# Patient Record
Sex: FEMALE | Race: BLACK OR AFRICAN AMERICAN | NOT HISPANIC OR LATINO | URBAN - METROPOLITAN AREA
[De-identification: names, ages, dates, MRNs, and addresses within clinical notes are randomized per-mention and may not be internally consistent; named-entity substitution may affect disease eponyms.]

---

## 2021-03-15 ENCOUNTER — INPATIENT (INPATIENT)
Facility: HOSPITAL | Age: 46
LOS: 4 days | Discharge: HOME | DRG: 205 | End: 2021-03-20
Attending: INTERNAL MEDICINE | Admitting: INTERNAL MEDICINE
Payer: COMMERCIAL

## 2021-03-15 VITALS
DIASTOLIC BLOOD PRESSURE: 86 MMHG | OXYGEN SATURATION: 96 % | WEIGHT: 289.91 LBS | HEART RATE: 117 BPM | RESPIRATION RATE: 22 BRPM | SYSTOLIC BLOOD PRESSURE: 161 MMHG | HEIGHT: 70 IN | TEMPERATURE: 99 F

## 2021-03-15 DIAGNOSIS — Z98.891 HISTORY OF UTERINE SCAR FROM PREVIOUS SURGERY: Chronic | ICD-10-CM

## 2021-03-15 DIAGNOSIS — Z98.890 OTHER SPECIFIED POSTPROCEDURAL STATES: Chronic | ICD-10-CM

## 2021-03-15 DIAGNOSIS — D21.9 BENIGN NEOPLASM OF CONNECTIVE AND OTHER SOFT TISSUE, UNSPECIFIED: ICD-10-CM

## 2021-03-15 DIAGNOSIS — R06.02 SHORTNESS OF BREATH: ICD-10-CM

## 2021-03-15 LAB
ALBUMIN SERPL ELPH-MCNC: 4 G/DL — SIGNIFICANT CHANGE UP (ref 3.3–5)
ALBUMIN SERPL ELPH-MCNC: 4.1 G/DL — SIGNIFICANT CHANGE UP (ref 3.3–5)
ALP SERPL-CCNC: 64 U/L — SIGNIFICANT CHANGE UP (ref 40–120)
ALP SERPL-CCNC: 65 U/L — SIGNIFICANT CHANGE UP (ref 40–120)
ALT FLD-CCNC: 21 U/L — SIGNIFICANT CHANGE UP (ref 10–45)
ALT FLD-CCNC: 25 U/L — SIGNIFICANT CHANGE UP (ref 10–45)
ANION GAP SERPL CALC-SCNC: 10 MMOL/L — SIGNIFICANT CHANGE UP (ref 5–17)
ANION GAP SERPL CALC-SCNC: 13 MMOL/L — SIGNIFICANT CHANGE UP (ref 5–17)
APTT BLD: 27.4 SEC — LOW (ref 27.5–35.5)
AST SERPL-CCNC: 25 U/L — SIGNIFICANT CHANGE UP (ref 10–40)
AST SERPL-CCNC: 26 U/L — SIGNIFICANT CHANGE UP (ref 10–40)
BASE EXCESS BLDV CALC-SCNC: 5.9 MMOL/L — HIGH (ref -2–2)
BASE EXCESS BLDV CALC-SCNC: 7.9 MMOL/L — HIGH (ref -2–2)
BASE EXCESS BLDV CALC-SCNC: 9.6 MMOL/L — HIGH (ref -2–2)
BASOPHILS # BLD AUTO: 0 K/UL — SIGNIFICANT CHANGE UP (ref 0–0.2)
BASOPHILS # BLD AUTO: 0.02 K/UL — SIGNIFICANT CHANGE UP (ref 0–0.2)
BASOPHILS NFR BLD AUTO: 0 % — SIGNIFICANT CHANGE UP (ref 0–2)
BASOPHILS NFR BLD AUTO: 0.2 % — SIGNIFICANT CHANGE UP (ref 0–2)
BILIRUB SERPL-MCNC: 0.4 MG/DL — SIGNIFICANT CHANGE UP (ref 0.2–1.2)
BILIRUB SERPL-MCNC: 0.4 MG/DL — SIGNIFICANT CHANGE UP (ref 0.2–1.2)
BUN SERPL-MCNC: 10 MG/DL — SIGNIFICANT CHANGE UP (ref 7–23)
BUN SERPL-MCNC: 12 MG/DL — SIGNIFICANT CHANGE UP (ref 7–23)
CA-I SERPL-SCNC: 1.19 MMOL/L — SIGNIFICANT CHANGE UP (ref 1.12–1.3)
CA-I SERPL-SCNC: 1.2 MMOL/L — SIGNIFICANT CHANGE UP (ref 1.12–1.3)
CA-I SERPL-SCNC: 1.25 MMOL/L — SIGNIFICANT CHANGE UP (ref 1.12–1.3)
CALCIUM SERPL-MCNC: 9.7 MG/DL — SIGNIFICANT CHANGE UP (ref 8.4–10.5)
CALCIUM SERPL-MCNC: 9.8 MG/DL — SIGNIFICANT CHANGE UP (ref 8.4–10.5)
CHLORIDE BLDV-SCNC: 94 MMOL/L — LOW (ref 96–108)
CHLORIDE BLDV-SCNC: 95 MMOL/L — LOW (ref 96–108)
CHLORIDE BLDV-SCNC: 99 MMOL/L — SIGNIFICANT CHANGE UP (ref 96–108)
CHLORIDE SERPL-SCNC: 94 MMOL/L — LOW (ref 96–108)
CHLORIDE SERPL-SCNC: 94 MMOL/L — LOW (ref 96–108)
CO2 BLDV-SCNC: 39 MMOL/L — HIGH (ref 22–30)
CO2 BLDV-SCNC: 40 MMOL/L — HIGH (ref 22–30)
CO2 BLDV-SCNC: 41 MMOL/L — HIGH (ref 22–30)
CO2 SERPL-SCNC: 32 MMOL/L — HIGH (ref 22–31)
CO2 SERPL-SCNC: 37 MMOL/L — HIGH (ref 22–31)
CREAT SERPL-MCNC: 0.71 MG/DL — SIGNIFICANT CHANGE UP (ref 0.5–1.3)
CREAT SERPL-MCNC: 0.76 MG/DL — SIGNIFICANT CHANGE UP (ref 0.5–1.3)
DACRYOCYTES BLD QL SMEAR: SLIGHT — SIGNIFICANT CHANGE UP
ELLIPTOCYTES BLD QL SMEAR: SLIGHT — SIGNIFICANT CHANGE UP
EOSINOPHIL # BLD AUTO: 0 K/UL — SIGNIFICANT CHANGE UP (ref 0–0.5)
EOSINOPHIL # BLD AUTO: 0.04 K/UL — SIGNIFICANT CHANGE UP (ref 0–0.5)
EOSINOPHIL NFR BLD AUTO: 0 % — SIGNIFICANT CHANGE UP (ref 0–6)
EOSINOPHIL NFR BLD AUTO: 0.4 % — SIGNIFICANT CHANGE UP (ref 0–6)
GAS PNL BLDA: SIGNIFICANT CHANGE UP
GAS PNL BLDV: 138 MMOL/L — SIGNIFICANT CHANGE UP (ref 135–145)
GAS PNL BLDV: 138 MMOL/L — SIGNIFICANT CHANGE UP (ref 135–145)
GAS PNL BLDV: 140 MMOL/L — SIGNIFICANT CHANGE UP (ref 135–145)
GAS PNL BLDV: SIGNIFICANT CHANGE UP
GIANT PLATELETS BLD QL SMEAR: PRESENT — SIGNIFICANT CHANGE UP
GLUCOSE BLDC GLUCOMTR-MCNC: 120 MG/DL — HIGH (ref 70–99)
GLUCOSE BLDV-MCNC: 109 MG/DL — HIGH (ref 70–99)
GLUCOSE BLDV-MCNC: 118 MG/DL — HIGH (ref 70–99)
GLUCOSE BLDV-MCNC: 141 MG/DL — HIGH (ref 70–99)
GLUCOSE SERPL-MCNC: 119 MG/DL — HIGH (ref 70–99)
GLUCOSE SERPL-MCNC: 131 MG/DL — HIGH (ref 70–99)
HCG SERPL-ACNC: <2 MIU/ML — SIGNIFICANT CHANGE UP
HCO3 BLDV-SCNC: 36 MMOL/L — HIGH (ref 21–29)
HCO3 BLDV-SCNC: 38 MMOL/L — HIGH (ref 21–29)
HCO3 BLDV-SCNC: 38 MMOL/L — HIGH (ref 21–29)
HCT VFR BLD CALC: 34.7 % — SIGNIFICANT CHANGE UP (ref 34.5–45)
HCT VFR BLD CALC: 34.9 % — SIGNIFICANT CHANGE UP (ref 34.5–45)
HCT VFR BLDA CALC: 26 % — LOW (ref 39–50)
HCT VFR BLDA CALC: 28 % — LOW (ref 39–50)
HCT VFR BLDA CALC: 30 % — LOW (ref 39–50)
HGB BLD CALC-MCNC: 8.3 G/DL — LOW (ref 11.5–15.5)
HGB BLD CALC-MCNC: 9 G/DL — LOW (ref 11.5–15.5)
HGB BLD CALC-MCNC: 9.6 G/DL — LOW (ref 11.5–15.5)
HGB BLD-MCNC: 8.8 G/DL — LOW (ref 11.5–15.5)
HGB BLD-MCNC: 9 G/DL — LOW (ref 11.5–15.5)
HYPOCHROMIA BLD QL: SIGNIFICANT CHANGE UP
IMM GRANULOCYTES NFR BLD AUTO: 0.6 % — SIGNIFICANT CHANGE UP (ref 0–1.5)
INR BLD: 1.11 RATIO — SIGNIFICANT CHANGE UP (ref 0.88–1.16)
LACTATE BLDV-MCNC: 1.2 MMOL/L — SIGNIFICANT CHANGE UP (ref 0.7–2)
LACTATE BLDV-MCNC: 1.2 MMOL/L — SIGNIFICANT CHANGE UP (ref 0.7–2)
LACTATE BLDV-MCNC: 2.1 MMOL/L — HIGH (ref 0.7–2)
LYMPHOCYTES # BLD AUTO: 1.18 K/UL — SIGNIFICANT CHANGE UP (ref 1–3.3)
LYMPHOCYTES # BLD AUTO: 1.38 K/UL — SIGNIFICANT CHANGE UP (ref 1–3.3)
LYMPHOCYTES # BLD AUTO: 12.5 % — LOW (ref 13–44)
LYMPHOCYTES # BLD AUTO: 17.4 % — SIGNIFICANT CHANGE UP (ref 13–44)
MAGNESIUM SERPL-MCNC: 2 MG/DL — SIGNIFICANT CHANGE UP (ref 1.6–2.6)
MANUAL SMEAR VERIFICATION: SIGNIFICANT CHANGE UP
MCHC RBC-ENTMCNC: 18.6 PG — LOW (ref 27–34)
MCHC RBC-ENTMCNC: 18.7 PG — LOW (ref 27–34)
MCHC RBC-ENTMCNC: 25.2 GM/DL — LOW (ref 32–36)
MCHC RBC-ENTMCNC: 25.9 GM/DL — LOW (ref 32–36)
MCV RBC AUTO: 72.1 FL — LOW (ref 80–100)
MCV RBC AUTO: 73.6 FL — LOW (ref 80–100)
MICROCYTES BLD QL: SLIGHT — SIGNIFICANT CHANGE UP
MONOCYTES # BLD AUTO: 0.97 K/UL — HIGH (ref 0–0.9)
MONOCYTES # BLD AUTO: 1.21 K/UL — HIGH (ref 0–0.9)
MONOCYTES NFR BLD AUTO: 12.2 % — SIGNIFICANT CHANGE UP (ref 2–14)
MONOCYTES NFR BLD AUTO: 12.8 % — SIGNIFICANT CHANGE UP (ref 2–14)
NEUTROPHILS # BLD AUTO: 5.57 K/UL — SIGNIFICANT CHANGE UP (ref 1.8–7.4)
NEUTROPHILS # BLD AUTO: 6.95 K/UL — SIGNIFICANT CHANGE UP (ref 1.8–7.4)
NEUTROPHILS NFR BLD AUTO: 70.4 % — SIGNIFICANT CHANGE UP (ref 43–77)
NEUTROPHILS NFR BLD AUTO: 73.5 % — SIGNIFICANT CHANGE UP (ref 43–77)
NRBC # BLD: 1 /100 WBCS — HIGH (ref 0–0)
NT-PROBNP SERPL-SCNC: 1345 PG/ML — HIGH (ref 0–300)
PCO2 BLDV: 100 MMHG — HIGH (ref 35–50)
PCO2 BLDV: 101 MMHG — HIGH (ref 35–50)
PCO2 BLDV: 83 MMHG — HIGH (ref 35–50)
PH BLDV: 7.17 — CRITICAL LOW (ref 7.35–7.45)
PH BLDV: 7.2 — LOW (ref 7.35–7.45)
PH BLDV: 7.28 — LOW (ref 7.35–7.45)
PHOSPHATE SERPL-MCNC: 5.1 MG/DL — HIGH (ref 2.5–4.5)
PLAT MORPH BLD: ABNORMAL
PLATELET # BLD AUTO: 509 K/UL — HIGH (ref 150–400)
PLATELET # BLD AUTO: 560 K/UL — HIGH (ref 150–400)
PO2 BLDV: 25 MMHG — SIGNIFICANT CHANGE UP (ref 25–45)
PO2 BLDV: 51 MMHG — HIGH (ref 25–45)
PO2 BLDV: 52 MMHG — HIGH (ref 25–45)
POIKILOCYTOSIS BLD QL AUTO: SLIGHT — SIGNIFICANT CHANGE UP
POLYCHROMASIA BLD QL SMEAR: SIGNIFICANT CHANGE UP
POTASSIUM BLDV-SCNC: 4.2 MMOL/L — SIGNIFICANT CHANGE UP (ref 3.5–5.3)
POTASSIUM BLDV-SCNC: 4.3 MMOL/L — SIGNIFICANT CHANGE UP (ref 3.5–5.3)
POTASSIUM BLDV-SCNC: 4.4 MMOL/L — SIGNIFICANT CHANGE UP (ref 3.5–5.3)
POTASSIUM SERPL-MCNC: 3.8 MMOL/L — SIGNIFICANT CHANGE UP (ref 3.5–5.3)
POTASSIUM SERPL-MCNC: 4.4 MMOL/L — SIGNIFICANT CHANGE UP (ref 3.5–5.3)
POTASSIUM SERPL-SCNC: 3.8 MMOL/L — SIGNIFICANT CHANGE UP (ref 3.5–5.3)
POTASSIUM SERPL-SCNC: 4.4 MMOL/L — SIGNIFICANT CHANGE UP (ref 3.5–5.3)
PROT SERPL-MCNC: 7.5 G/DL — SIGNIFICANT CHANGE UP (ref 6–8.3)
PROT SERPL-MCNC: 7.7 G/DL — SIGNIFICANT CHANGE UP (ref 6–8.3)
PROTHROM AB SERPL-ACNC: 13.3 SEC — SIGNIFICANT CHANGE UP (ref 10.6–13.6)
RBC # BLD: 4.74 M/UL — SIGNIFICANT CHANGE UP (ref 3.8–5.2)
RBC # BLD: 4.81 M/UL — SIGNIFICANT CHANGE UP (ref 3.8–5.2)
RBC # FLD: 23.2 % — HIGH (ref 10.3–14.5)
RBC # FLD: 23.6 % — HIGH (ref 10.3–14.5)
RBC BLD AUTO: ABNORMAL
SAO2 % BLDV: 31 % — LOW (ref 67–88)
SAO2 % BLDV: 73 % — SIGNIFICANT CHANGE UP (ref 67–88)
SAO2 % BLDV: 74 % — SIGNIFICANT CHANGE UP (ref 67–88)
SARS-COV-2 RNA SPEC QL NAA+PROBE: SIGNIFICANT CHANGE UP
SODIUM SERPL-SCNC: 139 MMOL/L — SIGNIFICANT CHANGE UP (ref 135–145)
SODIUM SERPL-SCNC: 141 MMOL/L — SIGNIFICANT CHANGE UP (ref 135–145)
TARGETS BLD QL SMEAR: SIGNIFICANT CHANGE UP
TROPONIN T, HIGH SENSITIVITY RESULT: 13 NG/L — SIGNIFICANT CHANGE UP (ref 0–51)
TROPONIN T, HIGH SENSITIVITY RESULT: 14 NG/L — SIGNIFICANT CHANGE UP (ref 0–51)
TSH SERPL-MCNC: 2.33 UIU/ML — SIGNIFICANT CHANGE UP (ref 0.27–4.2)
WBC # BLD: 7.91 K/UL — SIGNIFICANT CHANGE UP (ref 3.8–10.5)
WBC # BLD: 9.46 K/UL — SIGNIFICANT CHANGE UP (ref 3.8–10.5)
WBC # FLD AUTO: 7.91 K/UL — SIGNIFICANT CHANGE UP (ref 3.8–10.5)
WBC # FLD AUTO: 9.46 K/UL — SIGNIFICANT CHANGE UP (ref 3.8–10.5)

## 2021-03-15 PROCEDURE — 70450 CT HEAD/BRAIN W/O DYE: CPT | Mod: 26,MA

## 2021-03-15 PROCEDURE — 71275 CT ANGIOGRAPHY CHEST: CPT | Mod: 26,MA

## 2021-03-15 PROCEDURE — 93010 ELECTROCARDIOGRAM REPORT: CPT | Mod: NC

## 2021-03-15 PROCEDURE — 99285 EMERGENCY DEPT VISIT HI MDM: CPT

## 2021-03-15 RX ORDER — FUROSEMIDE 40 MG
40 TABLET ORAL DAILY
Refills: 0 | Status: DISCONTINUED | OUTPATIENT
Start: 2021-03-15 | End: 2021-03-17

## 2021-03-15 RX ORDER — FUROSEMIDE 40 MG
40 TABLET ORAL ONCE
Refills: 0 | Status: COMPLETED | OUTPATIENT
Start: 2021-03-15 | End: 2021-03-15

## 2021-03-15 RX ORDER — ONDANSETRON 8 MG/1
4 TABLET, FILM COATED ORAL ONCE
Refills: 0 | Status: COMPLETED | OUTPATIENT
Start: 2021-03-15 | End: 2021-03-15

## 2021-03-15 RX ORDER — HEPARIN SODIUM 5000 [USP'U]/ML
5000 INJECTION INTRAVENOUS; SUBCUTANEOUS EVERY 12 HOURS
Refills: 0 | Status: DISCONTINUED | OUTPATIENT
Start: 2021-03-15 | End: 2021-03-20

## 2021-03-15 RX ORDER — SODIUM CHLORIDE 9 MG/ML
1000 INJECTION, SOLUTION INTRAVENOUS ONCE
Refills: 0 | Status: COMPLETED | OUTPATIENT
Start: 2021-03-15 | End: 2021-03-15

## 2021-03-15 RX ADMIN — Medication 40 MILLIGRAM(S): at 14:50

## 2021-03-15 RX ADMIN — HEPARIN SODIUM 5000 UNIT(S): 5000 INJECTION INTRAVENOUS; SUBCUTANEOUS at 18:54

## 2021-03-15 RX ADMIN — SODIUM CHLORIDE 1000 MILLILITER(S): 9 INJECTION, SOLUTION INTRAVENOUS at 08:55

## 2021-03-15 NOTE — ED ADULT NURSE REASSESSMENT NOTE - NS ED NURSE REASSESS COMMENT FT1
Report received from Mana CARTAGENA.  Pt is resting comfortably on bipap.  Admitting at bedside.  Pt to receive lasix as per MD orders.  Safety and comfort maintained.  Will continue to monitor.

## 2021-03-15 NOTE — H&P ADULT - PROBLEM SELECTOR PLAN 1
Possibly from pulmonary HTN. Lasix 40 mgx1 ordered. Will get pulmonary and cardiology evaluation, TTE

## 2021-03-15 NOTE — ED PROVIDER NOTE - SEVERE SEPSIS ALERT DETAILS 2
ANNELISE Arceo:  I have seen and evaluated this patient and do not believe the patient is septic at this time.  I will continue to evaluate.

## 2021-03-15 NOTE — ED ADULT NURSE REASSESSMENT NOTE - NS ED NURSE REASSESS COMMENT FT1
Pt assisted with bedpan.  Pt tolerated well.  Safety and comfort maintained. Steady gait noted. Will continue to monitor.

## 2021-03-15 NOTE — H&P ADULT - HISTORY OF PRESENT ILLNESS
46 y/o female with PMHx fibroids s/p myomectomy x2 PSx  works from home now presenting to the ED with SOB x2 weeks and anxiety causing insomnia x2 weeks. Patient reported she cannot sleep more than 1-2 hours per night and is now falling asleep mid sentence slurring her words. Patient tried to take valerian root with no improvement of sleep. Patient has recent stressor of moving. Patient also feels like she cannot take a deep breath and catch her breath. Strong family history of lung cancer. Patient denied CP, h/o COVID, abdominal pain, N/V/D, weight loss, rash, fever chills, cough, back pain, dizziness, syncope, palpitations, h/o insomnia  CT Chest did not show PE, but did show dilated pulm artery

## 2021-03-15 NOTE — ED ADULT NURSE NOTE - OBJECTIVE STATEMENT
44 yo female presents to ED from home co insomnia x2 weeks with worsening SOB with exertion and when laying flat to sleep. Patient reports difficulty sleeping related to anxiety about SOB when laying flat, reports "falling asleep mid conversation". Patient denies CP, nvd, sick contacts, falls/loc. Patient A&OX3, breathing spontaneously, airway patent, bl clear lungs, abdomen nontender, +pulses, cap refill <2 seconds. Patient resting in bed, side rails up, plan of care explained. MD at bedside, cardiac monitor in place.

## 2021-03-15 NOTE — CONSULT NOTE ADULT - SUBJECTIVE AND OBJECTIVE BOX
HISTORY OF PRESENT ILLNESS: HPI:  44 y/o female with PMHx fibroids s/p myomectomy x2 PSx  works from home now presenting to the ED with SOB x2 weeks and anxiety causing insomnia x2 weeks. Patient reported she cannot sleep more than 1-2 hours per night and is now falling asleep mid sentence slurring her words. Patient tried to take valerian root with no improvement of sleep. Patient has recent stressor of moving. Patient also feels like she cannot take a deep breath and catch her breath. Strong family history of lung cancer. Patient denied CP, h/o COVID, abdominal pain, N/V/D, weight loss, rash, fever chills, cough, back pain, dizziness, syncope, palpitations, h/o insomnia  CT Chest did not show PE, but did show dilated pulm artery She is currently requiring BipAp for hypercarbia.  No CP nO LOC      PAST MEDICAL & SURGICAL HISTORY:  Fibroid    H/O:     H/O myomectomy            MEDICATIONS:  MEDICATIONS  (STANDING):  furosemide   Injectable 40 milliGRAM(s) IV Push daily  heparin   Injectable 5000 Unit(s) SubCutaneous every 12 hours      Allergies    No Known Allergies    Intolerances        FAMILY HISTORY:    Non-contributary for premature coronary disease or sudden cardiac death    SOCIAL HISTORY:    [ X] Non-smoker  [ ] Smoker  [ ] Alcohol    REVIEW OF SYSTEMS:  [ ]chest pain  [  ]shortness of breath  [  ]palpitations  [  ]syncope  [ ]near syncope [ ]upper extremity weakness   [ ] lower extremity weakness  [  ]diplopia  [  ]altered mental status   [  ]fevers  [ ]chills [ ]nausea  [ ]vomitting  [  ]dysphagia    [ ]abdominal pain  [ ]melena  [ ]BRBPR    [  ]epistaxis  [  ]rash    [ ]lower extremity edema        [ X] All others negative	  [ ] Unable to obtain      LABS:	 	    CARDIAC MARKERS:                              9.0    7.91  )-----------( 560      ( 15 Mar 2021 09:01 )             34.7     Hb Trend: 9.0<--    03-15    139  |  94<L>  |  12  ----------------------------<  131<H>  3.8   |  32<H>  |  0.76    Ca    9.7      15 Mar 2021 09:01    TPro  7.7  /  Alb  4.0  /  TBili  0.4  /  DBili  x   /  AST  26  /  ALT  21  /  AlkPhos  65  03-15    Creatinine Trend: 0.76<--    Coags:  PT/INR - ( 15 Mar 2021 09:01 )   PT: 13.3 sec;   INR: 1.11 ratio         PTT - ( 15 Mar 2021 09:01 )  PTT:27.4 sec    proBNP: Serum Pro-Brain Natriuretic Peptide: 1345 pg/mL (03-15 @ 09:01)    Lipid Profile:   HgA1c:   TSH: Thyroid Stimulating Hormone, Serum: 2.33 uIU/mL (03-15 @ 12:07)          PHYSICAL EXAM:  T(C): 35.6 (03-15-21 @ 18:15), Max: 37.4 (03-15-21 @ 08:05)  HR: 104 (03-15-21 @ 18:15) (90 - 117)  BP: 112/77 (03-15-21 @ 18:15) (112/77 - 161/86)  RR: 18 (03-15-21 @ 18:15) (18 - 24)  SpO2: 100% (03-15-21 @ 18:15) (96% - 100%)  Wt(kg): --   BMI (kg/m2): 41.6 (03-15-21 @ 08:05)  I&O's Summary      HEENT:  (-)icterus (-)pallor  CV: N S1 S2 1/6 SARAH (+)2 Pulses B/l  Resp:  Clear to ausculatation B/L, normal effort  GI: (+) BS Soft, NT, ND  Lymph:  (-)Edema, (-)obvious lymphadenopathy  Skin: Warm to touch, Normal turgor  Psych: Appropriate mood and affect          ECG:  	Sinus tach 102 BPM    RADIOLOGY:      CTA: (-) PA dilated PA    ASSESSMENT/PLAN: 	45y Female admitted with insominia, hypercarbia found with Dilated PA 9-) PE on CTPA    - No pulmonary edema on CTA, no fluid overload on exam  - Echo  - Suspect primary pulmonary process  - Further rec pending echo  - Pulmonary f/u    I once again thank you for allowing me to participate in the care of your patient.  If you have any questions or concerns please do not hesitate to contact me.    Brett Mantilla MD, [facility]  BEEPER [phone]

## 2021-03-15 NOTE — ED PROVIDER NOTE - OBJECTIVE STATEMENT
44 y/o female with PMHx fibroids s/p myomectomy x2 PSx  works from home now presenting to the ED with SOB x2 weeks and anxiety causing insomnia x2 weeks. Patient reported she cannot sleep more than 1-2 hours per night and is now falling asleep mid sentence slurring her words. Patient tried to take valerian root with no improvement of sleep. Patient has recent stressor of moving. Patient also feels like she cannot take a deep breath and catch her breath. Strong family history of lung cancer. Patient denied CP, h/o COVID, abdominal pain, N/V/D, weight loss, rash, fever chills, cough, back pain, dizziness, syncope, palpitations, h/o insomnia.

## 2021-03-15 NOTE — ED PROVIDER NOTE - ATTENDING CONTRIBUTION TO CARE
I performed a history and physical exam of the patient and discussed their management with the PA. I reviewed the PA's note and agree with the documented findings and plan of care. My medical decision making and observations are found above.    46 y/o female with PMHx fibroids s/p myomectomy x2 PSx  works from home now presenting to the ED with SOB x2 weeks. Patient reports "anxiety when eating and when laying flat/going to sleep. States she has not been very active at all since covid started and "hasn't been taking care of herself." Reports [place] and mild nonproductive cough but has not had covid contacts and feeling tired/not herself, and that sometimes her speech doesn't "make sense." On exam, patient is tired and anxious appearing. slightly tachypneic. no wheezing/rales. cv tachycardic rr, no m,r,g. 2+ distal pulses. abd soft, ntnd, no rigidity/distension. b/l lower extremities with symmetrical nonpitting edema. Dry mucous membranes. Possibly covid/infectious vs chf vs acs vs pe. mildly hypoxic upon er arrival. On supplemental O2. will get labs, imaging, low thresshold to admit.

## 2021-03-15 NOTE — CONSULT NOTE ADULT - ASSESSMENT
44 y/o female with PMHx fibroids s/p myomectomy x2 PSx  works from home now presenting to the ED with SOB x2 weeks and anxiety causing insomnia x2 weeks. Patient reported she cannot sleep more than 1-2 hours per night and is now falling asleep mid sentence slurring her words. Patient tried to take valerian root with no improvement of sleep. Patient has recent stressor of moving. Patient also feels like she cannot take a deep breath and catch her breath. Strong family history of lung cancer. Patient denied CP, h/o COVID, abdominal pain, N/V/D, weight loss, rash, fever chills, cough, back pain, dizziness, syncope, palpitations, h/o insomnia  CT Chest did not show PE, but did show dilated pulm artery (15 Mar 2021 12:37)  she is obese and says she has not been sleeping for months: She did try taking over the counter meds for sleeping but to no help: Many years ago > 5 years ago she was in weight loss program and remembers taking Lasix at that time: She did have pe and few years ago and was on Xarelto for 6 months and she says 5that was after the surgery : She had myomectomy before:  She is not taking any OCP as well as weight reduction pills at this time:  She has no underlying medical problems  She denies smoking as well as drugs:   She has one child and her  and she recently moved to her mothers house:   she also tells me that many times she was incoherent where people were not able to understand her:  She denies any other medical problem s  called her mother to get more history: she said that she is very weak and she can not sleep and she has insomnia but during the day she is very sleepy: When she sleep her mouth is always open: She snores loudly: the mother cant tell if she has apneas:   for tow or three years she was sleeping with her in [place] and she was prompted to go to  as she was snoring a lot:   She has HTN while she was pregnant: She is not taking any med for it now:   she is not sure whether she got sob on walking:        ARVIND/ OHS: NO ASTHMA: SHE NEVER SMOKED: Morbid Obesity: Acute on Chr hypercarbic resp failure : She  likely has OHS ( her admission hco3 is 31) and ARVIND: as she is very sleepy and  tired in the daytime and does not sleep well: Her cta is negative for pe though limited and her lung parenchyma looks OK: except some atelectasis at bases: Her PA are enlaced and she also likely has pulmonary hypertension she would need echo to rule out other cardiac diseases as well as pulm htn measurement: She is already  on Lasix. She would need bipap for now and then gradually change to prn in daytime and full time at night time: would increase bipap to 15 and 5  : ABG about 5-6 PM and one in morning: Avoid any narcotics and sedatives as well as benzos   GESTATIONAL HYPERTENSION: Normotensive at this time  LIKELY PULMONARY HYPERTENSION: needs echo  hx of PE: She had pe many years ago after the surgery : CTA at this time is negative though limited: but would also do dopplers:   DVT lalayxnam

## 2021-03-15 NOTE — ED PROVIDER NOTE - PROGRESS NOTE DETAILS
POCUS is limited 2/2 difficult windose however no obvious B lines. Heart with possible anterior wall hypokinesis but overall contractility is preserved. RV is difficult to assess. IVC plethoric. ANNELISE Arceo: patient becoming more acidotic pH 7.2 on vbg and pCO2 100. will place on bipap and order ABG to be done by respiratory. made medicine attending Dr. Jauregui aware of above and agreed with BiPAP

## 2021-03-15 NOTE — CHART NOTE - NSCHARTNOTEFT_GEN_A_CORE
[id number]  EVERARDO HENRIQUEZ    Event Summary:  RRT called for AMS. Per RN, patient not responding appropriately when questioned and also trying to get out of bed. During the RRT, ABG and labs were drawn. Patient currently on BiPAP 15/5, FiO2 40% with good tidal volumes.       T(C): 36.6 (03-15-21 @ 20:30), Max: 37.4 (03-15-21 @ 08:05)  HR: 104 (03-15-21 @ 20:30) (89 - 117)  BP: 115/75 (03-15-21 @ 20:30) (112/77 - 161/86)  RR: 20 (03-15-21 @ 20:30) (18 - 24)  SpO2: 100% (03-15-21 @ 20:30) (96% - 100%)      Physical Exam (post RRT):  General: Obese female sitting in bed, NAD, alert and awake, following commands appropriately, on BiPAP  Head:  NC/AT  CV: (+) tachycardic, S1S2   Respiratory: CTA B/L, nonlabored on BiPAP  MSK: No BLLE edema, + peripheral pulses, FROM all 4 extremity  Skin: (+) warm, dry   Neuro: Nonfocal          A/P:   Briefly, this is a 44 y/o female with PMHx fibroids s/p myomectomy x2 PSx  works from home now presenting to the ED with SOB x2 weeks and anxiety causing insomnia x2 weeks. Patient reported she cannot sleep more than 1-2 hours per night and is now falling asleep mid sentence slurring her words. CTA chest in ED negative for PE.   Patient now seen post  RRT for altered mental status. Vital signs hemodynamically stable, patient responding appropriately at this time.     - See RRT note for full details  - Vital signs hemodynamically stable  - Will continue with current BiPAP settings (15/5, FiO2 40%) pending results of ABG  - Will continue to monitor patient/vitals and f/u labs  - Fall precautions  - Bed alarm  - Attending Dr. Jauregui made aware of the above and requested a formal MICU evaluation  - Family made aware?        Agus Kapadia PA-C   Department of Medicine  [id number]  EVERARDO AGUDELO    Event Summary:  RRT called for AMS. Per RN, patient not responding appropriately when questioned and also trying to get out of bed. During the RRT, ABG and labs were drawn. Patient currently on BiPAP 15/5, FiO2 40% with good tidal volumes.       T(C): 36.6 (03-15-21 @ 20:30), Max: 37.4 (03-15-21 @ 08:05)  HR: 104 (03-15-21 @ 20:30) (89 - 117)  BP: 115/75 (03-15-21 @ 20:30) (112/77 - 161/86)  RR: 20 (03-15-21 @ 20:30) (18 - 24)  SpO2: 100% (03-15-21 @ 20:30) (96% - 100%)      Physical Exam (post RRT):  General: Obese female sitting in bed, NAD, alert and awake, following commands appropriately, on BiPAP  Head:  NC/AT  CV: (+) tachycardic, S1S2   Respiratory: CTA B/L, nonlabored on BiPAP  MSK: No BLLE edema, + peripheral pulses, FROM all 4 extremity  Skin: (+) warm, dry   Neuro: Nonfocal          A/P:   Briefly, this is a 44 y/o female with PMHx fibroids s/p myomectomy x2 PSx  works from home now presenting to the ED with SOB x2 weeks and anxiety causing insomnia x2 weeks. Patient reported she cannot sleep more than 1-2 hours per night and is now falling asleep mid sentence slurring her words. CTA chest in ED negative for PE.   Patient now seen post  RRT for altered mental status. Vital signs hemodynamically stable, patient responding appropriately at this time.     - See RRT note for full details  - Vital signs hemodynamically stable  - Will continue with current BiPAP settings (15/5, FiO2 40%) pending results of ABG  - Will continue to monitor patient/vitals and f/u labs  - Fall precautions  - Bed alarm  - Attending Dr. Jauregui made aware of the above and requested a formal MICU evaluation  - Patient's mother (Marie Agudelo) updated on the above events        Agus Kapadia PA-C   Department of Medicine  [phone]  EVERARDO AGUDELO    Event Summary:  RRT called for AMS. Per RN, patient not responding appropriately when questioned and also trying to get out of bed. During the RRT, ABG and labs were drawn. Patient currently on BiPAP 15/5, FiO2 40% with tidal volumes >300mL.       T(C): 36.6 (03-15-21 @ 20:30), Max: 37.4 (03-15-21 @ 08:05)  HR: 104 (03-15-21 @ 20:30) (89 - 117)  BP: 115/75 (03-15-21 @ 20:30) (112/77 - 161/86)  RR: 20 (03-15-21 @ 20:30) (18 - 24)  SpO2: 100% (03-15-21 @ 20:30) (96% - 100%)      Physical Exam (post RRT):  General: Obese female sitting in bed, NAD, alert and awake, following commands appropriately, on BiPAP  Head:  NC/AT  CV: (+) tachycardic, S1S2   Respiratory: CTA B/L, nonlabored on BiPAP  MSK: No BLLE edema, + peripheral pulses, FROM all 4 extremity  Skin: (+) warm, dry   Neuro: Nonfocal          A/P:   Briefly, this is a 46 y/o female with PMHx fibroids s/p myomectomy x2 PSx  works from home now presenting to the ED with SOB x2 weeks and anxiety causing insomnia x2 weeks. Patient reported she cannot sleep more than 1-2 hours per night and is now falling asleep mid sentence slurring her words. CTA chest in ED negative for PE.   Patient now seen post RRT for altered mental status. Vital signs hemodynamically stable, patient responding appropriately at this time; she endorsed feeling tired.     - See RRT note for full details  - Vital signs hemodynamically stable  - Will continue with current BiPAP settings (15/5, FiO2 40%) pending results of ABG  - Will continue to monitor patient/vitals and f/u labs  - Avoid narcotics/sedatives/benzos  - Fall precautions  - Bed alarm  - TTE pending  - LE dopplers pending  - Attending Dr. Jauregui made aware of the above and requested a formal MICU evaluation  - Patient's mother (Marie Agudelo) updated on the above events. Per collateral information obtained from patient's mother, patient does not use BiPAP or CPAP at home.         Agus Kapadia PA-C   Department of Medicine  [phone]

## 2021-03-15 NOTE — CONSULT NOTE ADULT - SUBJECTIVE AND OBJECTIVE BOX
03-15-21 @ 14:51    Patient is a 45y old  Female who presents with a chief complaint of     HPI:  44 y/o female with PMHx fibroids s/p myomectomy x2 PSx  works from home now presenting to the ED with SOB x2 weeks and anxiety causing insomnia x2 weeks. Patient reported she cannot sleep more than 1-2 hours per night and is now falling asleep mid sentence slurring her words. Patient tried to take valerian root with no improvement of sleep. Patient has recent stressor of moving. Patient also feels like she cannot take a deep breath and catch her breath. Strong family history of lung cancer. Patient denied CP, h/o COVID, abdominal pain, N/V/D, weight loss, rash, fever chills, cough, back pain, dizziness, syncope, palpitations, h/o insomnia  CT Chest did not show PE, but did show dilated pulm artery (15 Mar 2021 12:37)    she is obese and says she has not been sleeping for months: She did try taking over the counter meds for sleeping but to no help: Many years ago > 5 years ago she was in weight loss program and remembers taking Lasix at that time: She did have pe and few years ago and was on Xarelto for 6 months and she says 5that was after the surgery : She had myomectomy before:  She is not taking any OCP as well as weight reduction pills at this time:  She has no underlying medical problems  She denies smoking as well as drugs:   She has one child and her  and she recently moved to her mothers house:   she also tells me that many times she was incoherent where people were not able to understand her:  She denies any other medical problem s  called her mother to get more history: she said that she is very weak and she can not sleep and she has insomnia but during the day she is very sleepy: When she sleep her mouth is always open: She snores loudly: the mother cant tell if she has apneas:   for tow or three years she was sleeping with her in [place] and she was prompted to go to  as she was snoring a lot:   She has HTN while she was pregnant: She is not taking any med for it now:   she is not sure whether she got sob on walking:      ?FOLLOWING PRESENT  [y ] Hx of PE/DVT, [x ] Hx COPD, [ x] Hx of Asthma, [ y] Hx of Hospitalization, [ x]  Hx of BiPAP/CPAP use, [x ] Hx of ARVIND    Allergies    No Known Allergies    Intolerances        PAST MEDICAL & SURGICAL HISTORY:  Fibroid    H/O:     H/O myomectomy        FAMILY HISTORY:      Social History: [x  ] TOBACCO                  [ x ] ETOH                                 [x ] IVDA/DRUGS    REVIEW OF SYSTEMS      General:	sleepy    Skin/Breast:x  	  Ophthalmologic:x  	  ENMT:	x    Respiratory and Thorax: snoring  	  Cardiovascular:	x    Gastrointestinal:	x    Genitourinary:	x    Musculoskeletal:	x    Neurological:	x    Psychiatric:	x    Hematology/Lymphatics:	 leg swelling    Endocrine:	x    Allergic/Immunologic:	x    MEDICATIONS  (STANDING):  heparin   Injectable 5000 Unit(s) SubCutaneous every 12 hours    MEDICATIONS  (PRN):       Vital Signs Last 24 Hrs  T(C): 37.4 (15 Mar 2021 11:00), Max: 37.4 (15 Mar 2021 08:05)  T(F): 99.4 (15 Mar 2021 11:00), Max: 99.4 (15 Mar 2021 11:00)  HR: 103 (15 Mar 2021 14:50) (92 - 117)  BP: 123/83 (15 Mar 2021 14:50) (123/83 - 161/86)  BP(mean): 87 (15 Mar 2021 14:00) (87 - 96)  RR: 22 (15 Mar 2021 14:50) (22 - 24)  SpO2: 98% (15 Mar 2021 14:50) (96% - 100%)Orthostatic VS          I&O's Summary      Physical Exam:   GENERAL: obese  HEENT: MANSOOR/   Atraumatic, Normocephalic  ENMT: No tonsillar erythema, exudates, or enlargement; Moist mucous membranes, Good dentition, No lesions  NECK: Supple, No JVD, Normal thyroid  CHEST/LUNG: Clear to auscultation bilaterally: poor air entry   CVS: Regular rate and rhythm; No murmurs, rubs, or gallops  GI: : Soft, Nontender, Nondistended; Bowel sounds present  NERVOUS SYSTEM:  Awake and responds to simple questions   EXTREMITIES:  + edema  LYMPH: No lymphadenopathy noted  SKIN: No rashes or lesions  ENDOCRINOLOGY: No Thyromegaly  PSYCH: calm" [place] ethargic    Labs:  ABG - ( 15 Mar 2021 13:50 )  pH, Arterial: 7.26  pH, Blood: x     /  pCO2: 89    /  pO2: 48    / HCO3: 38    / Base Excess: 9.6   /  SaO2: 74              Venous<101<4>>52<<7.175>>Venous<<3><<4><<5<<529>>, Venous<100<4>>51<<7.205>>Venous<<3><<4><<5<<519>>, Venous<83<4>>25<<7.285>>Venous<<3><<4><<5<<259>>COVID-19 PCR: NotDetec (15 Mar 2021 08:59)                              9.0    7.91  )-----------( 560      ( 15 Mar 2021 09:01 )             34.7     03-15    139  |  94<L>  |  12  ----------------------------<  131<H>  3.8   |  32<H>  |  0.76    Ca    9.7      15 Mar 2021 09:01    TPro  7.7  /  Alb  4.0  /  TBili  0.4  /  DBili  x   /  AST  26  /  ALT  21  /  AlkPhos  65  03-15    CAPILLARY BLOOD GLUCOSE      POCT Blood Glucose.: 119 mg/dL (15 Mar 2021 08:10)    LIVER FUNCTIONS - ( 15 Mar 2021 09:01 )  Alb: 4.0 g/dL / Pro: 7.7 g/dL / ALK PHOS: 65 U/L / ALT: 21 U/L / AST: 26 U/L / GGT: x           PT/INR - ( 15 Mar 2021 09:01 )   PT: 13.3 sec;   INR: 1.11 ratio         PTT - ( 15 Mar 2021 09:01 )  PTT:27.4 sec    D DImer  Serum Pro-Brain Natriuretic Peptide: 1345 pg/mL (03-15 @ 09:01)      Studies  Chest X-RAY  CT SCAN Chest   CT Abdomen  Venous Dopplers: LE:   Others    rad< from: CT Head No Cont (03.15.21 @ 10:50) >    INTERPRETATION:  CT OF THE HEAD WITHOUT CONTRAST    CLINICAL INDICATION: Altered mental status, slurred speech.    TECHNIQUE: Volumetric CT acquisition was performed through the brain and reviewed using brain and bone window technique. Sagittal and coronal reconstructed images were obtained. Dose optimization techniques were utilized including kVp/mA modulation along with iterative reconstructions.    Radiation dose estimate:  The DLP was 809.91 mGy-cm    COMPARISON: No prior studies have been submitted for comparison.    FINDINGS:    The ventricular and sulcal size and configuration is age appropriate.   There is no acute loss of gray-white differentiation.    There is no evidence of mass effect, midline shift, acute intracranial hemorrhage, or extra-axial collections.    The visualized paranasal sinuses and temporal bones are adequately developed and aerated.      IMPRESSION:    No evidence of acute infarct, intracranial hemorrhage or mass effect.    < end of copied text >  < from: CT Angio Chest w/ IV Cont (03.15.21 @ 10:48) >  CONTRAST/COMPLICATIONS:  IV Contrast: Omnipaque 350 / 60 cc administered / 30 cc discarded.  Oral Contrast: None  Complications: None    PROCEDURE:  CT Angiography of the Chest.  Sagittal and coronal reformats were performed as well as 3D (MIP) reconstructions.    FINDINGS:    LUNGS AND AIRWAYS: Patent central airways.  Bilateral lower lung linear and basilar subsegmental atelectasis.  PLEURA: No pleural effusion.  MEDIASTINUM AND SHAYE: No lymphadenopathy.  VESSELS: No main, right, left, or lobar pulmonary embolism. Evaluation of segmental and subsegmental pulmonary arteries is limited by contrast bolus and respiratory motion. Pulmonary artery is enlarged.  HEART: Heart size is normal. No pericardial effusion.  CHEST WALL AND LOWER NECK: Within normal limits.  VISUALIZED UPPER ABDOMEN: Cholelithiasis.  BONES: Degenerative changes.    IMPRESSION:  No main, right, left, or lobar pulmonary embolism. Evaluation of segmental and subsegmental pulmonary arteries is limited.    Pulmonary artery is mildly enlarged which can be seen in the setting of pulmonary arterial hypertension.              EMANUEL BROWN MD; Resident Interventional Radiology  This document has been electronically signed.  JL KNIGHT MD; Attending Radiologist  This document has been electronicallysigned. Mar 15 2021 11:56AM    < end of copied text >

## 2021-03-15 NOTE — ED ADULT TRIAGE NOTE - NS ED TRIAGE AVPU SCALE
Patient is requesting a refill for the diflucan for yeast symptoms. Is this ok to refill?    Alert-The patient is alert, awake and responds to voice. The patient is oriented to time, place, and person. The triage nurse is able to obtain subjective information.

## 2021-03-16 DIAGNOSIS — R41.0 DISORIENTATION, UNSPECIFIED: ICD-10-CM

## 2021-03-16 DIAGNOSIS — D62 ACUTE POSTHEMORRHAGIC ANEMIA: ICD-10-CM

## 2021-03-16 LAB
ALBUMIN SERPL ELPH-MCNC: 3.4 G/DL — SIGNIFICANT CHANGE UP (ref 3.3–5)
ALP SERPL-CCNC: 56 U/L — SIGNIFICANT CHANGE UP (ref 40–120)
ALT FLD-CCNC: 21 U/L — SIGNIFICANT CHANGE UP (ref 10–45)
ANION GAP SERPL CALC-SCNC: 14 MMOL/L — SIGNIFICANT CHANGE UP (ref 5–17)
APPEARANCE UR: ABNORMAL
AST SERPL-CCNC: 24 U/L — SIGNIFICANT CHANGE UP (ref 10–40)
BACTERIA # UR AUTO: NEGATIVE — SIGNIFICANT CHANGE UP
BASE EXCESS BLDA CALC-SCNC: 17.5 MMOL/L — HIGH (ref -2–2)
BILIRUB SERPL-MCNC: 0.6 MG/DL — SIGNIFICANT CHANGE UP (ref 0.2–1.2)
BILIRUB UR-MCNC: NEGATIVE — SIGNIFICANT CHANGE UP
BUN SERPL-MCNC: 14 MG/DL — SIGNIFICANT CHANGE UP (ref 7–23)
CALCIUM SERPL-MCNC: 9.5 MG/DL — SIGNIFICANT CHANGE UP (ref 8.4–10.5)
CHLORIDE SERPL-SCNC: 91 MMOL/L — LOW (ref 96–108)
CO2 BLDA-SCNC: 46 MMOL/L — HIGH (ref 22–30)
CO2 SERPL-SCNC: 34 MMOL/L — HIGH (ref 22–31)
COLOR SPEC: YELLOW — SIGNIFICANT CHANGE UP
CREAT SERPL-MCNC: 0.96 MG/DL — SIGNIFICANT CHANGE UP (ref 0.5–1.3)
DIFF PNL FLD: NEGATIVE — SIGNIFICANT CHANGE UP
EPI CELLS # UR: 8 /HPF — HIGH
GAS PNL BLDA: SIGNIFICANT CHANGE UP
GLUCOSE SERPL-MCNC: 70 MG/DL — SIGNIFICANT CHANGE UP (ref 70–99)
GLUCOSE UR QL: NEGATIVE — SIGNIFICANT CHANGE UP
HCO3 BLDA-SCNC: 44 MMOL/L — HIGH (ref 21–29)
HCT VFR BLD CALC: 30.8 % — LOW (ref 34.5–45)
HGB BLD-MCNC: 7.9 G/DL — LOW (ref 11.5–15.5)
HYALINE CASTS # UR AUTO: 13 /LPF — HIGH (ref 0–2)
KETONES UR-MCNC: NEGATIVE — SIGNIFICANT CHANGE UP
LEUKOCYTE ESTERASE UR-ACNC: ABNORMAL
MCHC RBC-ENTMCNC: 18.5 PG — LOW (ref 27–34)
MCHC RBC-ENTMCNC: 25.6 GM/DL — LOW (ref 32–36)
MCV RBC AUTO: 72.1 FL — LOW (ref 80–100)
NITRITE UR-MCNC: NEGATIVE — SIGNIFICANT CHANGE UP
NRBC # BLD: 1 /100 WBCS — HIGH (ref 0–0)
PCO2 BLDA: 68 MMHG — HIGH (ref 32–46)
PH BLDA: 7.42 — SIGNIFICANT CHANGE UP (ref 7.35–7.45)
PH UR: 6 — SIGNIFICANT CHANGE UP (ref 5–8)
PLATELET # BLD AUTO: 423 K/UL — HIGH (ref 150–400)
PO2 BLDA: 78 MMHG — SIGNIFICANT CHANGE UP (ref 74–108)
POTASSIUM SERPL-MCNC: 4.8 MMOL/L — SIGNIFICANT CHANGE UP (ref 3.5–5.3)
POTASSIUM SERPL-SCNC: 4.8 MMOL/L — SIGNIFICANT CHANGE UP (ref 3.5–5.3)
PROT SERPL-MCNC: 6.5 G/DL — SIGNIFICANT CHANGE UP (ref 6–8.3)
PROT UR-MCNC: ABNORMAL
RBC # BLD: 4.27 M/UL — SIGNIFICANT CHANGE UP (ref 3.8–5.2)
RBC # FLD: 23.8 % — HIGH (ref 10.3–14.5)
RBC CASTS # UR COMP ASSIST: 0 /HPF — SIGNIFICANT CHANGE UP (ref 0–4)
SAO2 % BLDA: 96 % — SIGNIFICANT CHANGE UP (ref 92–96)
SARS-COV-2 IGG SERPL QL IA: NEGATIVE — SIGNIFICANT CHANGE UP
SARS-COV-2 IGM SERPL IA-ACNC: 0.07 INDEX — SIGNIFICANT CHANGE UP
SODIUM SERPL-SCNC: 139 MMOL/L — SIGNIFICANT CHANGE UP (ref 135–145)
SP GR SPEC: 1.02 — SIGNIFICANT CHANGE UP (ref 1.01–1.02)
UROBILINOGEN FLD QL: NEGATIVE — SIGNIFICANT CHANGE UP
WBC # BLD: 8.24 K/UL — SIGNIFICANT CHANGE UP (ref 3.8–10.5)
WBC # FLD AUTO: 8.24 K/UL — SIGNIFICANT CHANGE UP (ref 3.8–10.5)
WBC UR QL: 11 /HPF — HIGH (ref 0–5)

## 2021-03-16 PROCEDURE — 99223 1ST HOSP IP/OBS HIGH 75: CPT | Mod: GC

## 2021-03-16 PROCEDURE — 99254 IP/OBS CNSLTJ NEW/EST MOD 60: CPT | Mod: GC

## 2021-03-16 PROCEDURE — 70450 CT HEAD/BRAIN W/O DYE: CPT | Mod: 26

## 2021-03-16 PROCEDURE — 93970 EXTREMITY STUDY: CPT | Mod: 26

## 2021-03-16 RX ADMIN — HEPARIN SODIUM 5000 UNIT(S): 5000 INJECTION INTRAVENOUS; SUBCUTANEOUS at 06:09

## 2021-03-16 RX ADMIN — HEPARIN SODIUM 5000 UNIT(S): 5000 INJECTION INTRAVENOUS; SUBCUTANEOUS at 17:37

## 2021-03-16 RX ADMIN — Medication 40 MILLIGRAM(S): at 06:09

## 2021-03-16 NOTE — RAPID RESPONSE TEAM SUMMARY - NSSITUATIONBACKGROUNDRRT_GEN_ALL_CORE
45F w/ PMH of PMH of fibroids s/p myomectomy x2 PSx  works from home now p/w 2 wk hx of SOB, anxiety, and insomnia.  Found in hypercapneic respiratory failure, likely 2/2 undiagnosed OHS+ARVIND.  RRT called for acute encephalopathy.  Patient mentating and moving all 4 extremities.  FS within normal limits.  ABG drawn and sent and advised to call MICU if pCO2  45F w/ PMH of PMH of fibroids s/p myomectomy x2 PSx  works from home now p/w 2 wk hx of SOB, anxiety, and insomnia.  Found in hypercapneic respiratory failure, likely 2/2 undiagnosed OHS+ARVIND.  RRT called for acute encephalopathy.  Patient mentating and moving all 4 extremities.  FS within normal limits.  ABG drawn and sent and advised to call MICU if pCO2 > 100.  Patient hemodynamically stable and RRT was ended.

## 2021-03-16 NOTE — CONSULT NOTE ADULT - SUBJECTIVE AND OBJECTIVE BOX
46 y/o female with PMHx fibroids s/p myomectomy x2 PSx , hx of PE, of pre-eclampsia works from home now presenting to the ED with SOB & intermittent confusion x2 weeks and anxiety causing insomnia x2 weeks. Pt was started on AVAPS & given IV lasix x 2 doses. Nephrology was called due to pt's acid base disturbance. Pt denies any hx of kidney dz in self & family. Takes valerian root extract for insomnia. No blood/pus in urine or foamy urine. No OTC med or recent abx used. No smoke/ etoh/ drugs. Does not take any regular meds.              46 y/o female with PMHx fibroids s/p myomectomy x2 PSx , hx of PE, of pre-eclampsia works from home now presenting to the ED with SOB & intermittent confusion x2 weeks and anxiety causing insomnia x2 weeks. Pt was started on AVAPS & given IV lasix x 2 doses. Nephrology was called due to pt's acid base disturbance. Pt denies any hx of kidney dz in self & family. No significant surgical hx.  Takes valerian root extract for insomnia. No blood/pus in urine or foamy urine. No OTC med or recent abx used. No smoke/ etoh/ drugs. Does not take any routine meds. No chest pain, palpitations, light headedness/dizziness, cough, fevers/chills, abdominal pain, n/v, diarrhea/constipation, dysuria or increased urinary frequency. Admits to poor PO intake & dark colored urine                    PHYSICAL EXAM-  GENERAL: NAD, well-groomed, well-developed, obese  HEAD:  Atraumatic, Normocephalic  EYES: EOMI, PERRLA, conjunctiva and sclera clear  NECK: Supple, No JVD  NERVOUS SYSTEM:  Alert & Oriented X3, Motor Strength 5/5 B/L upper and lower extremities; DTRs 2+ intact and symmetric  CHEST/LUNG: Clear to auscultation bilaterally; No rales, rhonchi, wheezing, or rubs  HEART: Regular rate and rhythm; No murmurs, rubs, or gallops  ABDOMEN: Soft, Nontender, Nondistended; Bowel sounds present  EXTREMITIES:  2+ Peripheral Pulses, No clubbing, cyanosis, or edema  SKIN: No rashes or lesions                                         [facility] DIV[facility] OF KIDNEY DISEASES AND HYPERTENSION -- [phone]  -- INITIAL CONSULT NOTE  --------------------------------------------------------------------------------  HPI: 46 y/o female with PMHx fibroids s/p myomectomy x2 PSx , hx of PE, of pre-eclampsia works from home now presenting to the ED with SOB & intermittent confusion x2 weeks and anxiety causing insomnia x2 weeks. Pt was started on AVAPS due to respiratory acidosis while in house & given IV lasix x 2 doses for suspected PHTN & volume overload. Nephrology was called due to pt's acid base disturbance. Pt denies using CPAP at home. Denies any hx of kidney dz in self & family. Takes valerian root extract for insomnia. No blood/pus in urine or foamy urine. No OTC med or recent abx used. No smoke/ etoh/ drugs. Does not take any routine meds. No chest pain, palpitations, light headedness/dizziness, cough, fevers/chills, abdominal pain, n/v, diarrhea/constipation, dysuria or increased urinary frequency. Admits to poor PO intake & dark colored urine        PAST HISTORY  --------------------------------------------------------------------------------  PAST MEDICAL & SURGICAL HISTORY:  Fibroid    H/O:     H/O myomectomy      FAMILY HISTORY: none significant    PAST SOCIAL HISTORY:    ALLERGIES & MEDICATIONS  --------------------------------------------------------------------------------  Allergies    No Known Allergies    Intolerances      Standing Inpatient Medications  furosemide   Injectable 40 milliGRAM(s) IV Push daily  heparin   Injectable 5000 Unit(s) SubCutaneous every 12 hours    PRN Inpatient Medications      REVIEW OF SYSTEMS  --------------------------------------------------------------------------------  Gen: No fevers/chills  Skin: No rashes  Head/Eyes/Ears: Normal hearing,   Respiratory: No dyspnea, cough  CV: No chest pain  GI: No abdominal pain, diarrhea  : No dysuria, hematuria  MSK: No  edema  Heme: No easy bruising or bleeding  Psych: No significant depression    All other systems were reviewed and are negative, except as noted.    VITALS/PHYSICAL EXAM  --------------------------------------------------------------------------------  T(C): 37.2 (21 @ 20:49), Max: 37.6 (03-15-21 @ 22:53)  HR: 104 (21 @ 20:49) (90 - 111)  BP: 107/70 (21 @ 20:49) (102/64 - 126/77)  RR: 18 (21 @ 20:49) (18 - 20)  SpO2: 97% (21 @ 20:49) (92% - 100%)  Wt(kg): --  Height (cm): 177.8 (03-15-21 @ 20:30)  Weight (kg): 126.8 (03-15-21 @ 20:30)  BMI (kg/m2): 40.1 (03-15-21 @ 20:30)  BSA (m2): 2.41 (03-15-21 @ 20:30)      03-15-21 @ 07:01  -  21 @ 07:00  --------------------------------------------------------  IN: 0 mL / OUT: 600 mL / NET: -600 mL    21 @ 07:01  -  21 @ 21:56  --------------------------------------------------------  IN: 120 mL / OUT: 300 mL / NET: -180 mL      Physical Exam:  	Gen: NAD  	HEENT: BENNY  	Pulm: CTA B/L  	CV: S1S2  	Abd: Soft, +BS   	Ext: No LE edema B/L  	Neuro: Awake  	Skin: Warm and dry  	Vascular access:    LABS/STUDIES  --------------------------------------------------------------------------------              7.9    8.24  >-----------<  423      [21 @ 06:53]              30.8     139  |  91  |  14  ----------------------------<  70      [21 @ 06:52]  4.8   |  34  |  0.96        Ca     9.5     [21 06:52]      Mg     2.0     [03-15-21 @ 22:06]      Phos  5.1     [03-15-21 @ 22:06]    TPro  6.5  /  Alb  3.4  /  TBili  0.6  /  DBili  x   /  AST  24  /  ALT  21  /  AlkPhos  56  [21 @ 06:52]    PT/INR: PT 13.3 , INR 1.11       [03-15-21 @ 09:01]  PTT: 27.4       [03-15-21 @ 09:01]      Creatinine Trend:  SCr 0.96 [ @ 06:52]  SCr 0.71 [03-15 @ 22:06]  SCr 0.76 [03-15 @ 09:01]    Urinalysis - [21 @ 06:52]      Color Yellow / Appearance Slightly Turbid / SG 1.020 / pH 6.0      Gluc Negative / Ketone Negative  / Bili Negative / Urobili Negative       Blood Negative / Protein 30 mg/dL / Leuk Est Large / Nitrite Negative      RBC 0 / WBC 11 / Hyaline 13 / Gran  / Sq Epi  / Non Sq Epi 8 / Bacteria Negative      TSH 2.33      [03-15-21 @ 12:07]                    PHYSICAL EXAM-  GENERAL: NAD, well-groomed, well-developed, obese  HEAD:  Atraumatic, Normocephalic  EYES: EOMI, PERRLA, conjunctiva and sclera clear  NECK: Supple, No JVD  NERVOUS SYSTEM:  Alert & Oriented X3, Motor Strength 5/5 B/L upper and lower extremities; DTRs 2+ intact and symmetric  CHEST/LUNG: Clear to auscultation bilaterally; No rales, rhonchi, wheezing, or rubs  HEART: Regular rate and rhythm; No murmurs, rubs, or gallops  ABDOMEN: Soft, Nontender, Nondistended; Bowel sounds present  EXTREMITIES:  2+ Peripheral Pulses, No clubbing, cyanosis, or edema  SKIN: No rashes or lesions

## 2021-03-16 NOTE — PROGRESS NOTE ADULT - SUBJECTIVE AND OBJECTIVE BOX
Date of Service: 21 @ 11:59    Patient is a 45y old  Female who presents with a chief complaint of Hypercapnic Respiratory Failure (16 Mar 2021 00:00)      Any change in ROS: She was switched to AVAPS last night: she is doing much better and is alert and awake    MEDICATIONS  (STANDING):  furosemide   Injectable 40 milliGRAM(s) IV Push daily  heparin   Injectable 5000 Unit(s) SubCutaneous every 12 hours    MEDICATIONS  (PRN):    Vital Signs Last 24 Hrs  T(C): 37.4 (16 Mar 2021 11:54), Max: 37.6 (15 Mar 2021 22:53)  T(F): 99.4 (16 Mar 2021 11:54), Max: 99.6 (15 Mar 2021 22:53)  HR: 111 (16 Mar 2021 11:54) (89 - 112)  BP: 107/55 (16 Mar 2021 11:54) (103/66 - 145/67)  BP(mean): 87 (15 Mar 2021 14:00) (87 - 87)  RR: 18 (16 Mar 2021 11:54) (18 - 24)  SpO2: 92% (16 Mar 2021 11:54) (92% - 100%)    I&O's Summary    15 Mar 2021 07:  -  16 Mar 2021 07:00  --------------------------------------------------------  IN: 0 mL / OUT: 600 mL / NET: -600 mL    16 Mar 2021 07:01  -  16 Mar 2021 11:59  --------------------------------------------------------  IN: 0 mL / OUT: 300 mL / NET: -300 mL          Physical Exam:   GENERAL:Morbidly obese+  HEENT: MANSOOR/   Atraumatic, Normocephalic  ENMT: No tonsillar erythema, exudates, or enlargement; Moist mucous membranes, Good dentition, No lesions  NECK: Supple, No JVD, Normal thyroid  CHEST/LUNG: poor air entry : bu t poor exam secondary to obesity   CVS: Regular rate and rhythm; No murmurs, rubs, or gallops  GI: : Soft, Nontender, Nondistended; Bowel sounds present  NERVOUS SYSTEM:  Alert & Oriented X3  EXTREMITIES: + edema  LYMPH: No lymphadenopathy noted  SKIN: No rashes or lesions  ENDOCRINOLOGY: No Thyromegaly  PSYCH: Appropriate    Labs:  ABG - ( 16 Mar 2021 09:24 )  pH, Arterial: 7.51  pH, Blood: x     /  pCO2: 49    /  pO2: 136   / HCO3: 39    / Base Excess: 14.7  /  SaO2: 100             36, 38, 38                            7.9    8.24  )-----------( 423      ( 16 Mar 2021 06:53 )             30.8                         8.8    9.46  )-----------( 509      ( 15 Mar 2021 22:06 )             34.9                         9.0    7.91  )-----------( 560      ( 15 Mar 2021 09:01 )             34.7     03-16    139  |  91<L>  |  14  ----------------------------<  70  4.8   |  34<H>  |  0.96  03-15    141  |  94<L>  |  10  ----------------------------<  119<H>  4.4   |  37<H>  |  0.71  03-15    139  |  94<L>  |  12  ----------------------------<  131<H>  3.8   |  32<H>  |  0.76    Ca    9.5      16 Mar 2021 06:52  Ca    9.8      15 Mar 2021 22:06  Ca    9.7      15 Mar 2021 09:01  Phos  5.1     03-15  Mg     2.0     03-15    TPro  6.5  /  Alb  3.4  /  TBili  0.6  /  DBili  x   /  AST  24  /  ALT  21  /  AlkPhos  56  03-16  TPro  7.5  /  Alb  4.1  /  TBili  0.4  /  DBili  x   /  AST  25  /  ALT  25  /  AlkPhos  64  03-15  TPro  7.7  /  Alb  4.0  /  TBili  0.4  /  DBili  x   /  AST  26  /  ALT  21  /  AlkPhos  65  03-15    CAPILLARY BLOOD GLUCOSE      POCT Blood Glucose.: 120 mg/dL (15 Mar 2021 21:44)      LIVER FUNCTIONS - ( 16 Mar 2021 06:52 )  Alb: 3.4 g/dL / Pro: 6.5 g/dL / ALK PHOS: 56 U/L / ALT: 21 U/L / AST: 24 U/L / GGT: x           PT/INR - ( 15 Mar 2021 09:01 )   PT: 13.3 sec;   INR: 1.11 ratio         PTT - ( 15 Mar 2021 09:01 )  PTT:27.4 sec  Urinalysis Basic - ( 16 Mar 2021 06:52 )    Color: Yellow / Appearance: Slightly Turbid / S.020 / pH: x  Gluc: x / Ketone: Negative  / Bili: Negative / Urobili: Negative   Blood: x / Protein: 30 mg/dL / Nitrite: Negative   Leuk Esterase: Large / RBC: 0 /hpf / WBC 11 /HPF   Sq Epi: x / Non Sq Epi: 8 /hpf / Bacteria: Negative      Serum Pro-Brain Natriuretic Peptide: 1345 pg/mL (03-15 @ 09:01)        RECENT CULTURES:        RESPIRATORY CULTURES:        < from: CT Angio Chest w/ IV Cont (03.15.21 @ 10:48) >  CONTRAST/COMPLICATIONS:  IV Contrast: Omnipaque 350 / 60 cc administered / 30 cc discarded.  Oral Contrast: None  Complications: None    PROCEDURE:  CT Angiography of the Chest.  Sagittal and coronal reformats were performed as well as 3D (MIP) reconstructions.    FINDINGS:    LUNGS AND AIRWAYS: Patent central airways.  Bilateral lower lung linear and basilar subsegmental atelectasis.  PLEURA: No pleural effusion.  MEDIASTINUM AND SHAYE: No lymphadenopathy.  VESSELS: No main, right, left, or lobar pulmonary embolism. Evaluation of segmental and subsegmental pulmonary arteries is limited by contrast bolus and respiratory motion. Pulmonary artery is enlarged.  HEART: Heart size is normal. No pericardial effusion.  CHEST WALL AND LOWER NECK: Within normal limits.  VISUALIZED UPPER ABDOMEN: Cholelithiasis.  BONES: Degenerative changes.    IMPRESSION:  No main, right, left, or lobar pulmonary embolism. Evaluation of segmental and subsegmental pulmonary arteries is limited.    Pulmonary artery is mildly enlarged which can be seen in the setting of pulmonary arterial hypertension.              EMANUEL BROWN MD; Resident Interventional Radiology  This document has been electronically signed.  JL KNIGHT MD; Attending Radiologist  This document has been electronicallysigned. Mar 15 2021 11:56AM    < end of copied text >    Studies  Chest X-RAY  CT SCAN Chest   Venous Dopplers: LE:   CT Abdomen  Others

## 2021-03-16 NOTE — CHART NOTE - NSCHARTNOTEFT_GEN_A_CORE
To Whom It May Concern:     I am writing this letter of medical necessity and support on behalf of my patient, who is under the care for management of critically severe obstructive sleep apnea.  Ms. Marge Agudelo demonstrates shortness of breath and accessory muscle use at rest and exertion.  Pt's arteriel CO2 level is severely elevated at 102 mmHg.  For these reasons, I feel that she should be considered for advanced therapies with a diagnosis of severe obstructive sleep apnea.  At this point in the patient's disease process, other therapies, such as Bi-level, have been attempted and cannot adequately control patient's CO2 level.  She is an ideal candidate for non-invasive ventilation.      I believe non-invasive ventilation would be extremely beneficial in improving patient's ability to recover muscle and would dramatically improve quality of life.  This will decrease the work of breathing, decrease the amount of CO2, increase oxygenation and thus decrease hospital readmission.  This will also result in better overall patient comfort, improve patient ventilator sycnhony and thus decreasing the work of the patient's heart and lungs. it would likely provide additional medical benefits as a result of improved activity level and will help to decrease frequency of exacerbations and improve management of patient's disease process.  Without non-invasive ventilation, If feel the patient's condition will continue to deteriorate which will lead to further exacerbation and patient harm.      Thank you for your assistance in making this equipment available to my patient and helping the patient to improvde level of function and to maintain independence.      Sincerely,     Gladys Dailey DNP, ANP-BC  NPI#[id number]

## 2021-03-16 NOTE — CONSULT NOTE ADULT - ASSESSMENT
45F w/ PMH of PMH of fibroids s/p myomectomy x2 PSx  works from home now p/w 2 wk hx of SOB, anxiety, and insomnia.  Found in hypercapneic respiratory failure,   x2 weeks  negative 45F w/ PMH of PMH of fibroids s/p myomectomy x2 PSx  works from home now p/w 2 wk hx of SOB, anxiety, and insomnia.  Found in hypercapneic respiratory failure, likely 2/2 undiagnosed OHS+ARVIND.  Hypercapnea now worsening on BiPAP.  MICU consulted.    45F w/ PMH of PMH of fibroids s/p myomectomy x2 PSx  works from home now p/w 2 wk hx of SOB, anxiety, and insomnia.  Found in hypercapneic respiratory failure, likely 2/2 undiagnosed OHS+ARVIND.  Hypercapnea now worsening on BiPAP.  MICU consulted for worsening hypercapnea while on BiPAP.    #acute hypercapnic respiratory failure  - likely 2/2 undiagnosed OHS+ARVIND  - RRT called for AMS while on BiPAP: STAT ABG (3/15) 45F w/ PMH of PMH of fibroids s/p myomectomy x2 PSx  works from home now p/w 2 wk hx of SOB, anxiety, and insomnia.  Found in hypercapneic respiratory failure, likely 2/2 undiagnosed OHS+ARVIND.  Hypercapnea now worsening on BiPAP.  MICU consulted for worsening hypercapnea while on BiPAP.    #acute hypercapnic respiratory failure  - likely 2/2 undiagnosed OHS+ARVIND  - RRT called for AMS while on BiPAP: STAT ABG (3/15): 7.25/102/125/98%  - BiPAP settings on eval: 15/5 RR14, FiO2 40%, with variable tidal volume and minute ventilations going as high 11L/min but also going as low as 5L/min when pt falls asleep.  Pt likely requires high EPAP, and may benefit from AVAPS while asleep.    - worsening hypercapnea likely 2/2 inadequate ventilation.  Switch to AVAPS.   - pt should sleep in positions most comfortable for her and most conducive to respiration.    - Not a candidate for MICU level of care at this time; pt can remain on floors for now.  MICU will continue to follow NIPPV settings throughout the night while pt sleeps.    Pt examined and case discussed w/ MICU attending.    Jose Alfredo Pierre MD  Internal Medicine, PGY2  Pager: [phone]   [facility] pager: [phone]  45F w/ PMH of PMH of fibroids s/p myomectomy x2 PSx  works from home now p/w 2 wk hx of SOB, anxiety, and insomnia.  Found in hypercapneic respiratory failure, likely 2/2 undiagnosed OHS+ARVIND.  Hypercapnea now worsening on BiPAP.  MICU consulted for worsening hypercapnea while on BiPAP.    #acute hypercapnic respiratory failure  - likely 2/2 undiagnosed OHS+ARVIND  - RRT called for AMS while on BiPAP: STAT ABG (3/15): 7.25/102/125/98%  - BiPAP settings on eval: 15/5 RR14, FiO2 40%, with variable tidal volume and minute ventilations going as high 11L/min but also going as low as 5L/min when pt falls asleep.  Pt likely requires high EPAP, and may benefit from AVAPS while asleep.    - worsening hypercapnea likely 2/2 inadequate ventilation.  Switch to AVAPS EP 8, , Pmax 30, Pmin 20, FiO2 35%  - pt should sleep in positions most comfortable for her and most conducive to respiration.    - Not a candidate for MICU level of care at this time; pt can remain on floors for now.  MICU will continue to follow NIPPV settings throughout the night while pt sleeps.    Pt examined and case discussed w/ MICU attending.    Jose Alfredo Pierre MD  Internal Medicine, PGY2  Pager: [phone]   [facility] pager: [phone]  Regular rate & rhythm, normal S1, S2; no murmurs, gallops or rubs; no S3, S4

## 2021-03-16 NOTE — CONSULT NOTE ADULT - ASSESSMENT
UA 46 y/o female with PMHx fibroids s/p myomectomy x2 PSx , hx of PE, of pre-eclampsia works from home now presenting to the ED with SOB & intermittent confusion x2 weeks and anxiety causing insomnia x2 weeks. Pt was started on AVAPS & given IV lasix x 2 doses. Nephrology was called due to pt's acid base disturbance. Pt denies any hx of kidney dz in self & family. Takes valerian root extract for insomnia. No blood/pus in urine or foamy urine. No OTC med or recent abx used. No smoke/ etoh/ drugs. Does not take any regular meds.                 Acute on chronic hypercapnic respiratory failure likely from OHS/ ARVIND-   now with overcompensation from AVAPS & metabolic (contraction) alkalosis due to lasix x 2 doses  ABG 7.42/68/46 consistent with acute on chronic respiratory acidosis + metabolic alkalosis from lasix (expected PCO2 is 39)  CT Chest did not show PE, but did show dilated pulm artery, likely PHTN from OHS/ ARVIND  Pt appears euvolemic on exam  Would recommend to d/c lasix for now & re-assess  c/w AVAPS at night  Sleep study as outpatient  Weight loss would be benificial   UA with proteinuria, LE large, 11 WBCs. Low suspicion for UTI due to absence of fevers, leukocytosis or dysuria.   Check spot Pr/Cr  Cr 0.9 with GFR 72  Will follow with you          Plan discussed with primary team & Dr. Dunaway   44 y/o female with PMHx fibroids s/p myomectomy x2 PSx , hx of PE, of pre-eclampsia works from home now presenting to the ED with SOB & intermittent confusion x2 weeks and anxiety causing insomnia x2 weeks. Pt was started on AVAPS & given IV lasix x 2 doses. Nephrology was called due to pt's acid base disturbance. Pt denies any hx of kidney dz in self & family. Takes valerian root extract for insomnia. No blood/pus in urine or foamy urine. No OTC med or recent abx used. No smoke/ etoh/ drugs. Does not take any regular meds.                 Acute on chronic hypercapnic respiratory failure likely from OHS/ ARVIND-   now with overcompensation from AVAPS & metabolic (contraction) alkalosis due to lasix x 2 doses  ABG 7.42/68/46 consistent with acute on chronic respiratory acidosis + metabolic alkalosis from lasix (expected PCO2 is 39)  CT Chest did not show PE, but did show dilated pulm artery, likely PHTN from OHS/ ARVIND  Pt appears euvolemic on exam  Would recommend to d/c lasix for now & re-assess  c/w AVAPS at night  Sleep study as outpatient  Weight loss would be benificial   UA with proteinuria, LE large, 11 WBCs. Low suspicion for UTI due to absence of fevers, leukocytosis or dysuria.   Check spot Pr/Cr  Cr 0.9 with GFR 72  Will follow with you          Plan discussed with primary team    46 y/o female with PMHx fibroids s/p myomectomy x2 PSx , hx of PE, of pre-eclampsia works from home now presenting to the ED with SOB & intermittent confusion x2 weeks and anxiety causing insomnia x2 weeks. Pt was suspected to have OHS/ ARVIND, started on AVAPS & given IV lasix x 2 doses. Nephrology was called due to pt's acid base disturbance. Pt denies any hx of kidney dz in self & family. Takes valerian root extract for insomnia. No blood/pus in urine or frothy urine. No OTC med or recent abx used. No smoke/ etoh/ drugs. Does not take any regular meds. Denies any exogenous alkali intake             Acute on chronic hypercapnic respiratory failure likely from OHS/ ARVIND-   now with overcompensation from AVAPS & metabolic (contraction) alkalosis due to lasix x 2 doses  ABG on admission showed Respiratory acidosis without adequate metabolic compensation  ABG on 3/16/21:    7.42/68/46 consistent with acute on chronic respiratory acidosis (Primary disorder) likely from OHS/ ARVIND + metabolic alkalosis liekly from overcompensation due to AVAPS & addition of lasix (expected PCO2 is 39)  CT Chest did not show PE, but did show dilated pulm artery, likely PHTN from OHS/ ARVIND  Pt appears euvolemic on exam  Would recommend to d/c lasix for now & re-assess  c/w AVAPS at night. Avoid overcompensation  Sleep study as outpatient  Weight loss would be beneficial   UA with proteinuria, LE large, 11 WBCs. Low suspicion for UTI due to absence of fevers, leukocytosis or dysuria.   Check spot Pr/Cr  Cr 0.9 with GFR 72        Hyperphosphatemia- unknown etiology  Check ionized Ca, PTH, 1,25 vitamin d levels    Will follow with you          Plan discussed with primary team

## 2021-03-16 NOTE — PROVIDER CONTACT NOTE (CRITICAL VALUE NOTIFICATION) - ASSESSMENT
alert and oriented, ON AVAPS
pt A&OX4, VSS, pt denies SOB, cp.
pt is alert and oriented, unable to speak. showing actions only,

## 2021-03-16 NOTE — PROGRESS NOTE ADULT - ASSESSMENT
44 y/o female with PMHx fibroids s/p myomectomy x2 PSx  works from home now presenting to the ED with SOB x2 weeks and anxiety causing insomnia x2 weeks. Patient reported she cannot sleep more than 1-2 hours per night and is now falling asleep mid sentence slurring her words. Patient tried to take valerian root with no improvement of sleep. Patient has recent stressor of moving. Patient also feels like she cannot take a deep breath and catch her breath. Strong family history of lung cancer. Patient denied CP, h/o COVID, abdominal pain, N/V/D, weight loss, rash, fever chills, cough, back pain, dizziness, syncope, palpitations, h/o insomnia  CT Chest did not show PE, but did show dilated pulm artery (15 Mar 2021 12:37)  she is obese and says she has not been sleeping for months: She did try taking over the counter meds for sleeping but to no help: Many years ago > 5 years ago she was in weight loss program and remembers taking Lasix at that time: She did have pe and few years ago and was on Xarelto for 6 months and she says 5that was after the surgery : She had myomectomy before:  She is not taking any OCP as well as weight reduction pills at this time:  She has no underlying medical problems  She denies smoking as well as drugs:   She has one child and her  and she recently moved to her mothers house:   she also tells me that many times she was incoherent where people were not able to understand her:  She denies any other medical problem s  called her mother to get more history: she said that she is very weak and she can not sleep and she has insomnia but during the day she is very sleepy: When she sleep her mouth is always open: She snores loudly: the mother cant tell if she has apneas:   for tow or three years she was sleeping with her in [place] and she was prompted to go to  as she was snoring a lot:   She has HTN while she was pregnant: She is not taking any med for it now:   she is not sure whether she got sob on walking:        ARVIND/ OHS: NO ASTHMA: SHE NEVER SMOKED: Morbid Obesity: Acute on Chr hypercarbic resp failure : She  likely has OHS ( her admission hco3 is 31) and ARVIND: as she is very sleepy and  tired in the daytime and does not sleep well: Her cta is negative for pe though limited and her lung parenchyma looks OK: except some atelectasis at bases: Her PA are enlaced and she also likely has pulmonary hypertension she would need echo to rule out other cardiac diseases as well as pulm htn measurement: She is already  on Lasix. She would need bipap for now and then gradually change to prn in daytime and full time at night time: would increase bipap to 15 and 5  : ABG about 5-6 PM and one in morning: Avoid any narcotics and sedatives as well as benzos   GESTATIONAL HYPERTENSION: Normotensive at this time  LIKELY PULMONARY HYPERTENSION: needs echo  hx of PE: She had pe many years ago after the surgery : CTA at this time is negative though limited: but would also do dopplers:   DVT lalayxnam    3/16:    ARVIND/ OHS: NO ASTHMA: SHE NEVER SMOKED: Morbid Obesity: She was switched to avaps last night by MICU : currently sheis doign pretty good: Todasy ABG with overventilation: has met as wellas resp alkalosis baseline co2 somewhere arround high 50 to low 60:   GESTATIONAL HYPERTENSION: Normotensive at this time  LIKELY PULMONARY HYPERTENSION: needs echo  hx of PE: She had pe many years ago after the surgery : CTA at this time is negative though limited: but would also do dopplers- PENDING  DVT propahyxlis  dw np

## 2021-03-16 NOTE — PROVIDER CONTACT NOTE (CRITICAL VALUE NOTIFICATION) - ACTION/TREATMENT ORDERED:
will continue to monitor, will continue pt on AVAPS
ACP salome aware, ACP salome to look at previous ABG. Will cont to monitor.
MICU called for consultation. awaiting MICU evaluation

## 2021-03-16 NOTE — CONSULT NOTE ADULT - SUBJECTIVE AND OBJECTIVE BOX
CHIEF COMPLAINT:    HPI:    PAST MEDICAL & SURGICAL HISTORY:  Fibroid    H/O:   H/O myomectomy    FAMILY HISTORY:    SOCIAL HISTORY:  Smoking: __ packs x ___ years  EtOH Use:  Marital Status:  Occupation:  Recent Travel:  Country of Birth:  Advance Directives:    Allergies    No Known Allergies    Intolerances      HOME MEDICATIONS:    REVIEW OF SYSTEMS:  - limited as pt gets SOB with conversation  Constitutional: denies fevers, chills, weight loss, weight gain  HEENT: denies vision problems, eye pain, nasal congestion, rhinorrhea, sore throat, dysphagia  CV: denies chest pain, orthopnea, palpitations  Resp: +SOB; denies cough, dyspnea, wheezing, hemoptysis  GI: denies nausea, vomiting, diarrhea, constipation, abdominal pain  : denies burning w/ urination, urinary frequency, incontinence, bloody urine  Musculoskeletal: denies back pain, joint pain, muscle ache  Skin: denies rash or itching  Neurological: denies headache, dizziness, syncope, weakness, numbness  Psychiatric: denies anxiety, depression, hallucination  Endocrine: denies heat/cold intolerance  Hematologic/Lymphatic: denies easy bruising or bleeding, denies swollen LN  [ ] All other systems negative  [ ] Unable to assess ROS because ________    OBJECTIVE:  ICU Vital Signs Last 24 Hrs  T(C): 37.6 (15 Mar 2021 22:53), Max: 37.6 (15 Mar 2021 22:53)  T(F): 99.6 (15 Mar 2021 22:53), Max: 99.6 (15 Mar 2021 22:53)  HR: 104 (15 Mar 2021 20:30) (89 - 117)  BP: 115/75 (15 Mar 2021 20:30) (112/77 - 161/86)  BP(mean): 87 (15 Mar 2021 14:00) (87 - 96)  ABP: --  ABP(mean): --  RR: 20 (15 Mar 2021 20:30) (18 - 24)  SpO2: 100% (15 Mar 2021 20:30) (96% - 100%)        CAPILLARY BLOOD GLUCOSE  POCT Blood Glucose.: 120 mg/dL (15 Mar 2021 21:44)    PHYSICAL EXAM:  General: examined in bed, obese, lethargic, NAD  HEENT: atraumatic, normocephalic;   Neck: obese; supple, no LAD, no thyromegaly  Respiratory: +decreased lung sounds 2/2 habitus; faint bibasilar rales; not using accessory muscles  Cardiovascular: tachycardia; S1, S2, no M/R/G  Abdomen: normal BS; soft, non-distended, non-tender; no R/G  Extremities: radial and DP pulses intact b/l; no clubbing, cyanosis;  Skin: no rash appreciated;  Neurological: non-focal  Psychiatry: normal affect    HOSPITAL MEDICATIONS:  MEDICATIONS  (STANDING):  furosemide   Injectable 40 milliGRAM(s) IV Push daily  heparin   Injectable 5000 Unit(s) SubCutaneous every 12 hours    MEDICATIONS  (PRN):    LABS:                        8.8    9.46  )-----------( 509      ( 15 Mar 2021 22:06 )             34.9     -15    141  |  94<L>  |  10  ----------------------------<  119<H>  4.4   |  37<H>  |  0.71    Ca    9.8      15 Mar 2021 22:06  Phos  5.1     -15  Mg     2.0     03-15    TPro  7.5  /  Alb  4.1  /  TBili  0.4  /  DBili  x   /  AST  25  /  ALT  25  /  AlkPhos  64  03-15    PT/INR - ( 15 Mar 2021 09:01 )   PT: 13.3 sec;   INR: 1.11 ratio         PTT - ( 15 Mar 2021 09:01 )  PTT:27.4 sec    Arterial Blood Gas:  03-15 @ 22:05  7.25/102/121/43/98/13.2  ABG lactate: --  Arterial Blood Gas:  03-15 @ 18:37  7.27/90/119/40/98/11.5  ABG lactate: --  Arterial Blood Gas:  03-15 @ 13:50  7.26/89/48/38/74/9.6  ABG lactate: --    Venous Blood Gas:  03-15 @ 13:11  7.17/101/52/36/74  VBG Lactate: 1.2  Venous Blood Gas:  03-15 @ 12:16  7.20/100/51/38/73  VBG Lactate: 1.2  Venous Blood Gas:  03-15 @ 09:01  7.28/83/25/38/31  VBG Lactate: 2.1      MICROBIOLOGY:     RADIOLOGY:  [ ] Reviewed and interpreted by me    EKG: CHIEF COMPLAINT:    HPI: 46 y/o female with PMHx fibroids s/p myomectomy x2 PSx  works from home now presenting to the ED with SOB x2 weeks and anxiety causing insomnia x2 weeks. Patient reported she cannot sleep more than 1-2 hours per night and is now falling asleep mid sentence slurring her words. Patient tried to take valerian root with no improvement of sleep. Patient has recent stressor of moving. Patient also feels like she cannot take a deep breath and catch her breath. Strong family history of lung cancer. Patient denied CP, h/o COVID, abdominal pain, N/V/D, weight loss, rash, fever chills, cough, back pain, dizziness, syncope, palpitations, h/o insomnia.  CT Chest did not show PE, but did show dilated pulm artery.    On the medicine floors, pt started on BiPAP.  RRT called for AMS concerning for hypercapnea.  MICU consulted for worsening hypercapnic respiratory failure.      PAST MEDICAL & SURGICAL HISTORY:  Fibroid    H/O:   H/O myomectomy    FAMILY HISTORY:    SOCIAL HISTORY:  Smoking: __ packs x ___ years  EtOH Use:  Marital Status:  Occupation:  Recent Travel:  Country of Birth:  Advance Directives:    Allergies    No Known Allergies    Intolerances      HOME MEDICATIONS:    REVIEW OF SYSTEMS:  - limited as pt gets SOB with conversation  Constitutional: denies fevers, chills, weight loss, weight gain  HEENT: denies vision problems, eye pain, nasal congestion, rhinorrhea, sore throat, dysphagia  CV: denies chest pain, orthopnea, palpitations  Resp: +SOB; denies cough, dyspnea, wheezing, hemoptysis  GI: denies nausea, vomiting, diarrhea, constipation, abdominal pain  : denies burning w/ urination, urinary frequency, incontinence, bloody urine  Musculoskeletal: denies back pain, joint pain, muscle ache  Skin: denies rash or itching  Neurological: denies headache, dizziness, syncope, weakness, numbness  Psychiatric: denies anxiety, depression, hallucination  Endocrine: denies heat/cold intolerance  Hematologic/Lymphatic: denies easy bruising or bleeding, denies swollen LN  [ ] All other systems negative  [ ] Unable to assess ROS because ________    OBJECTIVE:  ICU Vital Signs Last 24 Hrs  T(C): 37.6 (15 Mar 2021 22:53), Max: 37.6 (15 Mar 2021 22:53)  T(F): 99.6 (15 Mar 2021 22:53), Max: 99.6 (15 Mar 2021 22:53)  HR: 104 (15 Mar 2021 20:30) (89 - 117)  BP: 115/75 (15 Mar 2021 20:30) (112/77 - 161/86)  BP(mean): 87 (15 Mar 2021 14:00) (87 - 96)  ABP: --  ABP(mean): --  RR: 20 (15 Mar 2021 20:30) (18 - 24)  SpO2: 100% (15 Mar 2021 20:30) (96% - 100%)        CAPILLARY BLOOD GLUCOSE  POCT Blood Glucose.: 120 mg/dL (15 Mar 2021 21:44)    PHYSICAL EXAM:  General: examined in bed, obese, lethargic, NAD  HEENT: atraumatic, normocephalic;   Neck: obese; supple, no LAD, no thyromegaly  Respiratory: +decreased lung sounds 2/2 habitus; faint bibasilar rales; not using accessory muscles  Cardiovascular: tachycardia; S1, S2, no M/R/G  Abdomen: normal BS; soft, non-distended, non-tender; no R/G  Extremities: radial and DP pulses intact b/l; no clubbing, cyanosis;  Skin: no rash appreciated;  Neurological: non-focal  Psychiatry: normal affect    HOSPITAL MEDICATIONS:  MEDICATIONS  (STANDING):  furosemide   Injectable 40 milliGRAM(s) IV Push daily  heparin   Injectable 5000 Unit(s) SubCutaneous every 12 hours    MEDICATIONS  (PRN):    LABS:                        8.8    9.46  )-----------( 509      ( 15 Mar 2021 22:06 )             34.9     03-15    141  |  94<L>  |  10  ----------------------------<  119<H>  4.4   |  37<H>  |  0.71    Ca    9.8      15 Mar 2021 22:06  Phos  5.1     03-15  Mg     2.0     03-15    TPro  7.5  /  Alb  4.1  /  TBili  0.4  /  DBili  x   /  AST  25  /  ALT  25  /  AlkPhos  64  03-15    PT/INR - ( 15 Mar 2021 09:01 )   PT: 13.3 sec;   INR: 1.11 ratio         PTT - ( 15 Mar 2021 09:01 )  PTT:27.4 sec    Arterial Blood Gas:  03-15 @ 22:05  7.25/102/121/43/98/13.2  ABG lactate: --  Arterial Blood Gas:  03-15 @ 18:37  7.27/90/119/40/98/11.5  ABG lactate: --  Arterial Blood Gas:  03-15 @ 13:50  7.26/89/48/38/74/9.6  ABG lactate: --    Venous Blood Gas:  03-15 @ 13:11  7.17/101/52/36/74  VBG Lactate: 1.2  Venous Blood Gas:  03-15 @ 12:16  7.20/100/51/38/73  VBG Lactate: 1.2  Venous Blood Gas:  03-15 @ 09:01  7.28/83/25/38/31  VBG Lactate: 2.1      MICROBIOLOGY:     RADIOLOGY:  [ ] Reviewed and interpreted by me    EKG: CHIEF COMPLAINT:    HPI: 44 y/o female with PMHx fibroids s/p myomectomy x2 PSx  works from home now presenting to the ED with SOB x2 weeks and anxiety causing insomnia x2 weeks. Patient reported she cannot sleep more than 1-2 hours per night and is now falling asleep mid sentence slurring her words. Patient tried to take valerian root with no improvement of sleep. Patient has recent stressor of moving. Patient also feels like she cannot take a deep breath and catch her breath. Strong family history of lung cancer. Patient denied CP, h/o COVID, abdominal pain, N/V/D, weight loss, rash, fever chills, cough, back pain, dizziness, syncope, palpitations, h/o insomnia.  CT Chest did not show PE, but did show dilated pulm artery.    On the medicine floors, pt started on BiPAP.  RRT called for AMS concerning for hypercapnea.  MICU consulted for worsening hypercapnic respiratory failure.      PAST MEDICAL & SURGICAL HISTORY:  Fibroid    H/O:   H/O myomectomy    FAMILY HISTORY:  unable to obtain, patient on BiPAP    SOCIAL HISTORY:  unable to obtain, patient on BiPAP    Allergies  No Known Allergies    REVIEW OF SYSTEMS:  - limited as pt gets SOB with conversation  Constitutional: denies fevers, chills, weight loss, weight gain  HEENT: denies vision problems, eye pain, nasal congestion, rhinorrhea, sore throat, dysphagia  CV: denies chest pain, orthopnea, palpitations  Resp: +SOB; denies cough, dyspnea, wheezing, hemoptysis  GI: denies nausea, vomiting, diarrhea, constipation, abdominal pain  : denies burning w/ urination, urinary frequency, incontinence, bloody urine  Musculoskeletal: denies back pain, joint pain, muscle ache  Skin: denies rash or itching  Neurological: denies headache, dizziness, syncope, weakness, numbness  Psychiatric: denies anxiety, depression, hallucination  Endocrine: denies heat/cold intolerance  Hematologic/Lymphatic: denies easy bruising or bleeding, denies swollen LN  [ ] All other systems negative  [ ] Unable to assess ROS because ________    OBJECTIVE:  ICU Vital Signs Last 24 Hrs  T(C): 37.6 (15 Mar 2021 22:53), Max: 37.6 (15 Mar 2021 22:53)  T(F): 99.6 (15 Mar 2021 22:53), Max: 99.6 (15 Mar 2021 22:53)  HR: 104 (15 Mar 2021 20:30) (89 - 117)  BP: 115/75 (15 Mar 2021 20:30) (112/77 - 161/86)  BP(mean): 87 (15 Mar 2021 14:00) (87 - 96)  RR: 20 (15 Mar 2021 20:30) (18 - 24)  SpO2: 100% (15 Mar 2021 20:30) (96% - 100%)    CAPILLARY BLOOD GLUCOSE  POCT Blood Glucose.: 120 mg/dL (15 Mar 2021 21:44)    PHYSICAL EXAM:  General: examined in bed, obese, lethargic, NAD  HEENT: atraumatic, normocephalic;   Neck: obese; supple, no LAD, no thyromegaly  Respiratory: +decreased lung sounds 2/2 habitus; faint bibasilar rales; not using accessory muscles  Cardiovascular: tachycardia; S1, S2, no M/R/G  Abdomen: normal BS; soft, non-distended, non-tender; no R/G  Extremities: radial and DP pulses intact b/l; no clubbing, cyanosis;  Skin: no rash appreciated;  Neurological: non-focal  Psychiatry: normal affect    HOSPITAL MEDICATIONS:  MEDICATIONS  (STANDING):  furosemide   Injectable 40 milliGRAM(s) IV Push daily  heparin   Injectable 5000 Unit(s) SubCutaneous every 12 hours    MEDICATIONS  (PRN):    LABS:                        8.8    9.46  )-----------( 509      ( 15 Mar 2021 22:06 )             34.9     03-15    141  |  94<L>  |  10  ----------------------------<  119<H>  4.4   |  37<H>  |  0.71    Ca    9.8      15 Mar 2021 22:06  Phos  5.1     03-15  Mg     2.0     03-15    TPro  7.5  /  Alb  4.1  /  TBili  0.4  /  DBili  x   /  AST  25  /  ALT  25  /  AlkPhos  64  03-15    PT/INR - ( 15 Mar 2021 09:01 )   PT: 13.3 sec;   INR: 1.11 ratio         PTT - ( 15 Mar 2021 09:01 )  PTT:27.4 sec    Arterial Blood Gas:  03-15 @ 22:05  7.25/102/121/43/98/13.2  ABG lactate: --  Arterial Blood Gas:  03-15 @ 18:37  7.27/90/119/40/98/11.5  ABG lactate: --  Arterial Blood Gas:  03-15 @ 13:50  7.26/89/48/38/74/9.6  ABG lactate: --    Venous Blood Gas:  03-15 @ 13:11  7.17/101/52/36/74  VBG Lactate: 1.2  Venous Blood Gas:  03-15 @ 12:16  7.20/100/51/38/73  VBG Lactate: 1.2  Venous Blood Gas:  03-15 @ 09:01  7.28/83/25/38/31  VBG Lactate: 2.1      RADIOLOGY:  [x ] Reviewed and interpreted by me  < from: CT Angio Chest w/ IV Cont (03.15.21 @ 10:48) >  No main, right, left, or lobar pulmonary embolism. Evaluation of segmental and subsegmental pulmonary arteries is limited.    Pulmonary artery is mildly enlarged which can be seen in the setting of pulmonary arterial hypertension.    < end of copied text >  < from: CT Head No Cont (03.15.21 @ 10:50) >    No evidence of acute infarct, intracranial hemorrhage or mass effect.    < end of copied text >    EKG: CHIEF COMPLAINT:    HPI: 44 y/o female with PMHx fibroids s/p myomectomy x2 PSx  works from home now presenting to the ED with SOB x2 weeks and anxiety causing insomnia x2 weeks. Patient reported she cannot sleep more than 1-2 hours per night and is now falling asleep mid sentence slurring her words. Patient tried to take valerian root with no improvement of sleep. Patient has recent stressor of moving. Patient also feels like she cannot take a deep breath and catch her breath. Strong family history of lung cancer. Patient denied CP, h/o COVID, abdominal pain, N/V/D, weight loss, rash, fever chills, cough, back pain, dizziness, syncope, palpitations, h/o insomnia.  CT Chest did not show PE, but did show dilated pulm artery.    On the medicine floors, pt started on BiPAP.  RRT called for AMS concerning for hypercapnea.  MICU consulted for worsening hypercapnic respiratory failure.      PAST MEDICAL & SURGICAL HISTORY:  Fibroid    H/O:   H/O myomectomy    FAMILY HISTORY:  unable to obtain, patient on BiPAP    SOCIAL HISTORY:  unable to obtain, patient on BiPAP    Allergies  No Known Allergies    REVIEW OF SYSTEMS:  - limited as pt gets SOB with conversation    OBJECTIVE:  ICU Vital Signs Last 24 Hrs  T(C): 37.6 (15 Mar 2021 22:53), Max: 37.6 (15 Mar 2021 22:53)  T(F): 99.6 (15 Mar 2021 22:53), Max: 99.6 (15 Mar 2021 22:53)  HR: 104 (15 Mar 2021 20:30) (89 - 117)  BP: 115/75 (15 Mar 2021 20:30) (112/77 - 161/86)  BP(mean): 87 (15 Mar 2021 14:00) (87 - 96)  RR: 20 (15 Mar 2021 20:30) (18 - 24)  SpO2: 100% (15 Mar 2021 20:30) (96% - 100%)    CAPILLARY BLOOD GLUCOSE  POCT Blood Glucose.: 120 mg/dL (15 Mar 2021 21:44)    PHYSICAL EXAM:  General: examined in bed, obese, lethargic, NAD  HEENT: atraumatic, normocephalic;   Neck: obese; supple, no LAD, no thyromegaly  Respiratory: +decreased lung sounds 2/2 habitus; faint bibasilar rales; not using accessory muscles  Cardiovascular: tachycardia; S1, S2, no M/R/G  Abdomen: normal BS; soft, non-distended, non-tender; no R/G  Extremities: radial and DP pulses intact b/l; no clubbing, cyanosis;  Skin: no rash appreciated;  Neurological: non-focal  Psychiatry: normal affect    HOSPITAL MEDICATIONS:  MEDICATIONS  (STANDING):  furosemide   Injectable 40 milliGRAM(s) IV Push daily  heparin   Injectable 5000 Unit(s) SubCutaneous every 12 hours    MEDICATIONS  (PRN):    LABS:                        8.8    9.46  )-----------( 509      ( 15 Mar 2021 22:06 )             34.9     -15    141  |  94<L>  |  10  ----------------------------<  119<H>  4.4   |  37<H>  |  0.71    Ca    9.8      15 Mar 2021 22:06  Phos  5.1     03-15  Mg     2.0     03-15    TPro  7.5  /  Alb  4.1  /  TBili  0.4  /  DBili  x   /  AST  25  /  ALT  25  /  AlkPhos  64  -15    PT/INR - ( 15 Mar 2021 09:01 )   PT: 13.3 sec;   INR: 1.11 ratio         PTT - ( 15 Mar 2021 09:01 )  PTT:27.4 sec    Arterial Blood Gas:  03-15 @ 22:05  7.25/102/121/43/98/13.2  ABG lactate: --  Arterial Blood Gas:  03-15 @ 18:37  7.27/90/119/40/98/11.5  ABG lactate: --  Arterial Blood Gas:  03-15 @ 13:50  7.26/89/48/38/74/9.6  ABG lactate: --    Venous Blood Gas:  03-15 @ 13:11  7.17/101/52/36/74  VBG Lactate: 1.2  Venous Blood Gas:  03-15 @ 12:16  7.20/100/51/38/73  VBG Lactate: 1.2  Venous Blood Gas:  03-15 @ 09:01  7.28/83/25/38/31  VBG Lactate: 2.1      RADIOLOGY:  [x ] Reviewed and interpreted by me  < from: CT Angio Chest w/ IV Cont (03.15.21 @ 10:48) >  No main, right, left, or lobar pulmonary embolism. Evaluation of segmental and subsegmental pulmonary arteries is limited.    Pulmonary artery is mildly enlarged which can be seen in the setting of pulmonary arterial hypertension.    < end of copied text >  < from: CT Head No Cont (03.15.21 @ 10:50) >    No evidence of acute infarct, intracranial hemorrhage or mass effect.    < end of copied text >    EKG:

## 2021-03-16 NOTE — PROGRESS NOTE ADULT - SUBJECTIVE AND OBJECTIVE BOX
S: Feels less SOB, denies chest pain or palpitations. Review of systems otherwise (-)    Review of Systems:   Constitutional: [ ] fevers, [ ] chills.   Skin: [ ] dry skin. [ ] rashes.  Psychiatric: [ ] depression, [ ] anxiety.   Gastrointestinal: [ ] BRBPR, [ ] melena.   Neurological: [ ] confusion. [ ] seizures. [ ] shuffling gait.   Ears,Nose,Mouth and Throat: [ ] ear pain [ ] sore throat.   Eyes: [ ] diplopia.   Respiratory: [ ] hemoptysis. [ ] shortness of breath  Cardiovascular: See HPI above  Hematologic/Lymphatic: [ ] anemia. [ ] painful nodes. [ ] prolonged bleeding.   Genitourinary: [ ] hematuria. [ ] flank pain.   Endocrine: [ ] significant change in weight. [ ] intolerance to heat and cold.     Review of systems [x ] otherwise negative, [ ] otherwise unable to obtain    FH: no family history of sudden cardiac death in first degree relatives    SH: [ ] tobacco, [ ] alcohol, [ ] drugs    furosemide   Injectable 40 milliGRAM(s) IV Push daily  heparin   Injectable 5000 Unit(s) SubCutaneous every 12 hours                            7.9    8.24  )-----------( 423      ( 16 Mar 2021 06:53 )             30.8       03-16    139  |  91<L>  |  14  ----------------------------<  70  4.8   |  34<H>  |  0.96    Ca    9.5      16 Mar 2021 06:52  Phos  5.1     03-15  Mg     2.0     03-15    TPro  6.5  /  Alb  3.4  /  TBili  0.6  /  DBili  x   /  AST  24  /  ALT  21  /  AlkPhos  56  03-16            T(C): 37.4 (03-16-21 @ 11:54), Max: 37.6 (03-15-21 @ 22:53)  HR: 111 (03-16-21 @ 11:54) (89 - 111)  BP: 107/55 (03-16-21 @ 11:54) (103/66 - 145/67)  RR: 18 (03-16-21 @ 11:54) (18 - 22)  SpO2: 92% (03-16-21 @ 11:54) (92% - 100%)  Wt(kg): --    I&O's Summary    15 Mar 2021 07:01  -  16 Mar 2021 07:00  --------------------------------------------------------  IN: 0 mL / OUT: 600 mL / NET: -600 mL    16 Mar 2021 07:01  -  16 Mar 2021 13:39  --------------------------------------------------------  IN: 0 mL / OUT: 300 mL / NET: -300 mL        General: Well nourished in no acute distress. Alert and Oriented * 3.   Head: Normocephalic and atraumatic.   Neck: No JVD. No bruits. Supple. Does not appear to be enlarged.   Cardiovascular: + S1,S2 ; RRR Soft systolic murmur at the left lower sternal border. No rubs noted.    Lungs: CTA b/l. No rhonchi, rales or wheezes.   Abdomen: + BS, soft. Non tender. Non distended. No rebound. No guarding.   Extremities: No clubbing/cyanosis/edema.   Neurologic: Moves all four extremities. Full range of motion.   Skin: Warm and moist. The patient's skin has normal elasticity and good skin turgor.   Psychiatric: Appropriate mood and affect.  Musculoskeletal: Normal range of motion, normal strength    ECG: Sinus tach 102 BPM    RADIOLOGY:      CTA: (-) PA dilated PA    ASSESSMENT/PLAN: 45y Female admitted with insomnia, SOB, and hypercarbia found with Dilated PA 9-) PE on CTPA    - MICU/RRT overnight noted for AMS/Hypercarbia now improved  - No pulmonary edema on CTA, no fluid overload on exam  - Echo pending  - Suspect primary pulmonary process  - Pulmonary f/u  - Further rec pending echo    Janak Langley PA-C  Pager: [phone]

## 2021-03-16 NOTE — CONSULT NOTE ADULT - ATTENDING COMMENTS
I have seen this patient with the fellow and agree with their assessment and plan. I was physically present for significant portions of the evaluation and management (E/M) service provided.  I agree with the above history, physical, and plan which I have reviewed and edited where appropriate.     1. Resp acidosis with chronic and acute met alk- chronic is compensatory from the resp acidosis. Given low chloride state and drop of pco2 from 90 to 45, likely post hypercapnic met alk vs diuretic related contraction now.   No signs of secondary hyperaldo state but check renin/diego level- and mineralcorticoid excess such as Cushing's possible  Check ACTH level as well  Less likely milk alkali syndrome as normocalcemic   Hold diuresis for now and monitor    2. Hyperphos- unclear why. Check again, repeat ionzied calcium, no major MARICHUY or CKD  check vitamin d levels    will follow along    Sherwin Dunaway MD  Cell   Pager   Office 
Patient seen and examined.  Agree with resident note as above.  Patient with hx as noted including obesity who presented to the hospital with dyspnea and anxiety.  Found to have acute on chronic hypercarbic resp failure, thought due to ARVIND/OHS, pHTN, R heart failure.  Patient is admitted to the medical floor and is getting BiPAP/diuresis.    TOnight we are called because patient was reportedly less responsive and ABG pCO2 was getting worse.  On exam, patient was on BiPAP 15/5 with good tidal volumes while awake, but volume inadequate when asleep - appears to be obstructing her airway.  We increased EPAP and changed to AVAPS to achieve better ventilation.  On exam, patient is easily arousable and lucid - can convey thoughts despite some difficulty talking on AVAPS.    -continue AVAPS tonight  -check 6AM ABG  -continue diuresis, awaits echo and LE duplex  -patient may sleep partially upright and on her side, this will assist in ventilation

## 2021-03-16 NOTE — PROGRESS NOTE ADULT - SUBJECTIVE AND OBJECTIVE BOX
Patient is a 45y old  Female who presents with a chief complaint of Hypercapnic Respiratory Failure (16 Mar 2021 00:00)      HPI:  DATE OF SERVICE: 21 @ 16:25  Awake and alert  Tolerated AVAPs overnight  Acidosis improved      PAST MEDICAL & SURGICAL HISTORY:  Fibroid    H/O:     H/O myomectomy        Review of Systems:   CONSTITUTIONAL: No fever, weight loss, or fatigue  EYES: No eye pain, visual disturbances, or discharge  ENMT:  No difficulty hearing, tinnitus, vertigo; No sinus or throat pain  NECK: No pain or stiffness  BREASTS: No pain, masses, or nipple discharge  RESPIRATORY: No cough, wheezing, chills or hemoptysis; No shortness of breath  CARDIOVASCULAR: No chest pain, palpitations, dizziness, or leg swelling  GASTROINTESTINAL: No abdominal or epigastric pain. No nausea, vomiting, or hematemesis; No diarrhea or constipation. No melena or hematochezia.  GENITOURINARY: No dysuria, frequency, hematuria, or incontinence  NEUROLOGICAL: No headaches, memory loss, loss of strength, numbness, or tremors  SKIN: No itching, burning, rashes, or lesions   LYMPH NODES: No enlarged glands  ENDOCRINE: No heat or cold intolerance; No hair loss  MUSCULOSKELETAL: No joint pain or swelling; No muscle, back, or extremity pain  PSYCHIATRIC: No depression, anxiety, mood swings, or difficulty sleeping  HEME/LYMPH: No easy bruising, or bleeding gums  ALLERY AND IMMUNOLOGIC: No hives or eczema    Allergies    No Known Allergies    Intolerances        Social History:     FAMILY HISTORY:      MEDICATIONS  (STANDING):  furosemide   Injectable 40 milliGRAM(s) IV Push daily  heparin   Injectable 5000 Unit(s) SubCutaneous every 12 hours    MEDICATIONS  (PRN):        CAPILLARY BLOOD GLUCOSE      POCT Blood Glucose.: 120 mg/dL (15 Mar 2021 21:44)    I&O's Summary    15 Mar 2021 07:  -  16 Mar 2021 07:00  --------------------------------------------------------  IN: 0 mL / OUT: 600 mL / NET: -600 mL    16 Mar 2021 07:01  -  16 Mar 2021 16:25  --------------------------------------------------------  IN: 120 mL / OUT: 300 mL / NET: -180 mL        PHYSICAL EXAM:  Vital Signs Last 24 Hrs  T(C): 37.4 (16 Mar 2021 11:54), Max: 37.6 (15 Mar 2021 22:53)  T(F): 99.4 (16 Mar 2021 11:54), Max: 99.6 (15 Mar 2021 22:53)  HR: 91 (16 Mar 2021 15:30) (89 - 111)  BP: 107/55 (16 Mar 2021 11:54) (103/66 - 126/77)  BP(mean): --  RR: 18 (16 Mar 2021 15:30) (18 - 20)  SpO2: 94% (16 Mar 2021 15:30) (92% - 100%)    GENERAL: NAD, well-developed  HEAD:  Atraumatic, Normocephalic  EYES: EOMI, PERRLA, conjunctiva and sclera clear  NECK: Supple, No JVD  CHEST/LUNG: Clear to auscultation bilaterally; No wheeze  HEART: Regular rate and rhythm; No murmurs, rubs, or gallops  ABDOMEN: Soft, Nontender, Nondistended; Bowel sounds present  EXTREMITIES:  2+ Peripheral Pulses, No clubbing, cyanosis, or edema  PSYCH: AAOx3  NEUROLOGY: non-focal  SKIN: No rashes or lesions    LABS:                        7.9    8.24  )-----------( 423      ( 16 Mar 2021 06:53 )             30.8     03-16    139  |  91<L>  |  14  ----------------------------<  70  4.8   |  34<H>  |  0.96    Ca    9.5      16 Mar 2021 06:52  Phos  5.1     03-15  Mg     2.0     03-15    TPro  6.5  /  Alb  3.4  /  TBili  0.6  /  DBili  x   /  AST  24  /  ALT  21  /  AlkPhos  56  03-16    PT/INR - ( 15 Mar 2021 09:01 )   PT: 13.3 sec;   INR: 1.11 ratio         PTT - ( 15 Mar 2021 09:01 )  PTT:27.4 sec      Urinalysis Basic - ( 16 Mar 2021 06:52 )    Color: Yellow / Appearance: Slightly Turbid / S.020 / pH: x  Gluc: x / Ketone: Negative  / Bili: Negative / Urobili: Negative   Blood: x / Protein: 30 mg/dL / Nitrite: Negative   Leuk Esterase: Large / RBC: 0 /hpf / WBC 11 /HPF   Sq Epi: x / Non Sq Epi: 8 /hpf / Bacteria: Negative        RADIOLOGY & ADDITIONAL TESTS:    Imaging Personally Reviewed:    Consultant(s) Notes Reviewed:      Care Discussed with Consultants/Other Providers:

## 2021-03-17 LAB
24R-OH-CALCIDIOL SERPL-MCNC: 9.2 NG/ML — LOW (ref 30–80)
ACTH SER-ACNC: 20.9 PG/ML — SIGNIFICANT CHANGE UP (ref 7.2–63.3)
ALDOST SERPL-MCNC: 12.7 NG/DL — SIGNIFICANT CHANGE UP
ANION GAP SERPL CALC-SCNC: 14 MMOL/L — SIGNIFICANT CHANGE UP (ref 5–17)
BASE EXCESS BLDA CALC-SCNC: 15.9 MMOL/L — HIGH (ref -2–2)
BLD GP AB SCN SERPL QL: NEGATIVE — SIGNIFICANT CHANGE UP
BUN SERPL-MCNC: 20 MG/DL — SIGNIFICANT CHANGE UP (ref 7–23)
CA-I BLD-SCNC: 1.12 MMOL/L — SIGNIFICANT CHANGE UP (ref 1.12–1.3)
CALCIUM SERPL-MCNC: 8.7 MG/DL — SIGNIFICANT CHANGE UP (ref 8.4–10.5)
CALCIUM SERPL-MCNC: 9.5 MG/DL — SIGNIFICANT CHANGE UP (ref 8.4–10.5)
CHLORIDE SERPL-SCNC: 94 MMOL/L — LOW (ref 96–108)
CO2 BLDA-SCNC: 45 MMOL/L — HIGH (ref 22–30)
CO2 SERPL-SCNC: 33 MMOL/L — HIGH (ref 22–31)
CORTIS AM PEAK SERPL-MCNC: 6 UG/DL — SIGNIFICANT CHANGE UP (ref 6–18.4)
CORTIS AM PEAK SERPL-MCNC: 6.7 UG/DL — SIGNIFICANT CHANGE UP (ref 6–18.4)
CREAT SERPL-MCNC: 0.81 MG/DL — SIGNIFICANT CHANGE UP (ref 0.5–1.3)
GAS PNL BLDA: SIGNIFICANT CHANGE UP
GLUCOSE SERPL-MCNC: 70 MG/DL — SIGNIFICANT CHANGE UP (ref 70–99)
HCO3 BLDA-SCNC: 42 MMOL/L — HIGH (ref 21–29)
HCT VFR BLD CALC: 28.7 % — LOW (ref 34.5–45)
HCT VFR BLD CALC: 29.9 % — LOW (ref 34.5–45)
HGB BLD-MCNC: 7.7 G/DL — LOW (ref 11.5–15.5)
HGB BLD-MCNC: 7.8 G/DL — LOW (ref 11.5–15.5)
MCHC RBC-ENTMCNC: 18.4 PG — LOW (ref 27–34)
MCHC RBC-ENTMCNC: 18.8 PG — LOW (ref 27–34)
MCHC RBC-ENTMCNC: 26.1 GM/DL — LOW (ref 32–36)
MCHC RBC-ENTMCNC: 26.8 GM/DL — LOW (ref 32–36)
MCV RBC AUTO: 70 FL — LOW (ref 80–100)
MCV RBC AUTO: 70.7 FL — LOW (ref 80–100)
NRBC # BLD: 0 /100 WBCS — SIGNIFICANT CHANGE UP (ref 0–0)
NRBC # BLD: 0 /100 WBCS — SIGNIFICANT CHANGE UP (ref 0–0)
PCO2 BLDA: 69 MMHG — HIGH (ref 32–46)
PH BLDA: 7.4 — SIGNIFICANT CHANGE UP (ref 7.35–7.45)
PHOSPHATE SERPL-MCNC: 2.1 MG/DL — LOW (ref 2.5–4.5)
PLATELET # BLD AUTO: 321 K/UL — SIGNIFICANT CHANGE UP (ref 150–400)
PLATELET # BLD AUTO: 363 K/UL — SIGNIFICANT CHANGE UP (ref 150–400)
PO2 BLDA: 83 MMHG — SIGNIFICANT CHANGE UP (ref 74–108)
POTASSIUM SERPL-MCNC: 3.7 MMOL/L — SIGNIFICANT CHANGE UP (ref 3.5–5.3)
POTASSIUM SERPL-SCNC: 3.7 MMOL/L — SIGNIFICANT CHANGE UP (ref 3.5–5.3)
PTH-INTACT FLD-MCNC: 117 PG/ML — HIGH (ref 15–65)
RBC # BLD: 4.1 M/UL — SIGNIFICANT CHANGE UP (ref 3.8–5.2)
RBC # BLD: 4.23 M/UL — SIGNIFICANT CHANGE UP (ref 3.8–5.2)
RBC # FLD: 23.5 % — HIGH (ref 10.3–14.5)
RBC # FLD: 23.8 % — HIGH (ref 10.3–14.5)
RENIN DIRECT, PLASMA: 33.5 PG/ML — HIGH
RH IG SCN BLD-IMP: POSITIVE — SIGNIFICANT CHANGE UP
RH IG SCN BLD-IMP: POSITIVE — SIGNIFICANT CHANGE UP
SAO2 % BLDA: 96 % — SIGNIFICANT CHANGE UP (ref 92–96)
SODIUM SERPL-SCNC: 141 MMOL/L — SIGNIFICANT CHANGE UP (ref 135–145)
VIT D25+D1,25 OH+D1,25 PNL SERPL-MCNC: 67.5 PG/ML — SIGNIFICANT CHANGE UP (ref 19.9–79.3)
WBC # BLD: 6.68 K/UL — SIGNIFICANT CHANGE UP (ref 3.8–10.5)
WBC # BLD: 7.59 K/UL — SIGNIFICANT CHANGE UP (ref 3.8–10.5)
WBC # FLD AUTO: 6.68 K/UL — SIGNIFICANT CHANGE UP (ref 3.8–10.5)
WBC # FLD AUTO: 7.59 K/UL — SIGNIFICANT CHANGE UP (ref 3.8–10.5)

## 2021-03-17 PROCEDURE — 99232 SBSQ HOSP IP/OBS MODERATE 35: CPT | Mod: GC

## 2021-03-17 PROCEDURE — 93306 TTE W/DOPPLER COMPLETE: CPT | Mod: 26

## 2021-03-17 RX ORDER — SODIUM,POTASSIUM PHOSPHATES 278-250MG
1 POWDER IN PACKET (EA) ORAL THREE TIMES A DAY
Refills: 0 | Status: COMPLETED | OUTPATIENT
Start: 2021-03-17 | End: 2021-03-18

## 2021-03-17 RX ORDER — NYSTATIN CREAM 100000 [USP'U]/G
1 CREAM TOPICAL
Refills: 0 | Status: DISCONTINUED | OUTPATIENT
Start: 2021-03-17 | End: 2021-03-20

## 2021-03-17 RX ORDER — NYSTATIN CREAM 100000 [USP'U]/G
1 CREAM TOPICAL
Refills: 0 | Status: DISCONTINUED | OUTPATIENT
Start: 2021-03-17 | End: 2021-03-17

## 2021-03-17 RX ADMIN — Medication 1 PACKET(S): at 21:17

## 2021-03-17 RX ADMIN — HEPARIN SODIUM 5000 UNIT(S): 5000 INJECTION INTRAVENOUS; SUBCUTANEOUS at 17:11

## 2021-03-17 RX ADMIN — Medication 1 PACKET(S): at 12:23

## 2021-03-17 RX ADMIN — HEPARIN SODIUM 5000 UNIT(S): 5000 INJECTION INTRAVENOUS; SUBCUTANEOUS at 06:05

## 2021-03-17 RX ADMIN — Medication 40 MILLIGRAM(S): at 06:05

## 2021-03-17 NOTE — PROGRESS NOTE ADULT - SUBJECTIVE AND OBJECTIVE BOX
[facility] DIVISION OF KIDNEY DISEASES AND HYPERTENSION -- FOLLOW UP NOTE  --------------------------------------------------------------------------------  Chief Complaint:    24 hour events/subjective:  improved acid base      PAST HISTORY  --------------------------------------------------------------------------------  No significant changes to PMH, PSH, FHx, SHx, unless otherwise noted    ALLERGIES & MEDICATIONS  --------------------------------------------------------------------------------  Allergies    No Known Allergies    Intolerances      Standing Inpatient Medications  furosemide   Injectable 40 milliGRAM(s) IV Push daily  heparin   Injectable 5000 Unit(s) SubCutaneous every 12 hours  potassium phosphate / sodium phosphate Powder (PHOS-NaK) 1 Packet(s) Oral three times a day    PRN Inpatient Medications      REVIEW OF SYSTEMS  --------------------------------------------------------------------------------  Gen: No weight changes, fatigue, fevers/chills, weakness  Skin: No rashes  Head/Eyes/Ears/Mouth: No headache; Normal hearing; Normal vision w/o blurriness; No sinus pain/discomfort, sore throat  Respiratory: No dyspnea, cough, wheezing, hemoptysis  CV: No chest pain, PND, orthopnea  GI: No abdominal pain, diarrhea, constipation, nausea, vomiting, melena, hematochezia  : No increased frequency, dysuria, hematuria, nocturia  MSK: No joint pain/swelling; no back pain; no edema  Neuro: No dizziness/lightheadedness, weakness, seizures, numbness, tingling  Heme: No easy bruising or bleeding  Endo: No heat/cold intolerance  Psych: No significant nervousness, anxiety, stress, depression    All other systems were reviewed and are negative, except as noted.    VITALS/PHYSICAL EXAM  --------------------------------------------------------------------------------  T(C): 36.8 (03-17-21 @ 12:12), Max: 37.2 (03-16-21 @ 20:49)  HR: 108 (03-17-21 @ 12:12) (91 - 108)  BP: 108/71 (03-17-21 @ 12:12) (102/64 - 115/74)  RR: 18 (03-17-21 @ 12:12) (18 - 18)  SpO2: 93% (03-17-21 @ 12:12) (93% - 100%)  Wt(kg): --  Height (cm): 177.8 (03-15-21 @ 20:30)  Weight (kg): 126.8 (03-15-21 @ 20:30)  BMI (kg/m2): 40.1 (03-15-21 @ 20:30)  BSA (m2): 2.41 (03-15-21 @ 20:30)      03-16-21 @ 07:01  -  03-17-21 @ 07:00  --------------------------------------------------------  IN: 490 mL / OUT: 300 mL / NET: 190 mL    03-17-21 @ 07:01  -  03-17-21 @ 12:51  --------------------------------------------------------  IN: 300 mL / OUT: 0 mL / NET: 300 mL      PHYSICAL EXAM: vital signs as above  in no apparent distress  Neck: Supple, no JVD,    Lungs: no rhonchi, no wheeze, no crackles  CVS: S1 S2 no M/R/G  Abdomen: no tenderness, no organomegaly, BS present  Neuro: Grossly intact  Skin: warm, dry  Ext: no cyanosis or clubbing, no edema      LABS/STUDIES  --------------------------------------------------------------------------------              7.7    6.68  >-----------<  363      [03-17-21 @ 07:00]              28.7     141  |  94  |  20  ----------------------------<  70      [03-17-21 @ 06:57]  3.7   |  33  |  0.81        Ca     9.5     [03-17-21 @ 06:57]      iCa    1.12     [03-17 @ 07:14]      Mg     2.0     [03-15-21 @ 22:06]      Phos  2.1     [03-17-21 @ 06:57]    TPro  6.5  /  Alb  3.4  /  TBili  0.6  /  DBili  x   /  AST  24  /  ALT  21  /  AlkPhos  56  [03-16-21 @ 06:52]          Creatinine Trend:  SCr 0.81 [03-17 @ 06:57]  SCr 0.96 [03-16 @ 06:52]  SCr 0.71 [03-15 @ 22:06]  SCr 0.76 [03-15 @ 09:01]    Urinalysis - [03-16-21 @ 06:52]      Color Yellow / Appearance Slightly Turbid / SG 1.020 / pH 6.0      Gluc Negative / Ketone Negative  / Bili Negative / Urobili Negative       Blood Negative / Protein 30 mg/dL / Leuk Est Large / Nitrite Negative      RBC 0 / WBC 11 / Hyaline 13 / Gran  / Sq Epi  / Non Sq Epi 8 / Bacteria Negative      TSH 2.33      [03-15-21 @ 12:07]

## 2021-03-17 NOTE — PROGRESS NOTE ADULT - SUBJECTIVE AND OBJECTIVE BOX
S: SOB improved, on nasal cannula. denies chest pain or palpitations. Review of systems otherwise (-)    Review of Systems:   Constitutional: [ ] fevers, [ ] chills.   Skin: [ ] dry skin. [ ] rashes.  Psychiatric: [ ] depression, [ ] anxiety.   Gastrointestinal: [ ] BRBPR, [ ] melena.   Neurological: [ ] confusion. [ ] seizures. [ ] shuffling gait.   Ears,Nose,Mouth and Throat: [ ] ear pain [ ] sore throat.   Eyes: [ ] diplopia.   Respiratory: [ ] hemoptysis. [ ] shortness of breath  Cardiovascular: See HPI above  Hematologic/Lymphatic: [ ] anemia. [ ] painful nodes. [ ] prolonged bleeding.   Genitourinary: [ ] hematuria. [ ] flank pain.   Endocrine: [ ] significant change in weight. [ ] intolerance to heat and cold.     Review of systems [x ] otherwise negative, [ ] otherwise unable to obtain    FH: no family history of sudden cardiac death in first degree relatives    SH: [ ] tobacco, [ ] alcohol, [ ] drugs      MEDICATIONS  (STANDING):  furosemide   Injectable 40 milliGRAM(s) IV Push daily  heparin   Injectable 5000 Unit(s) SubCutaneous every 12 hours  potassium phosphate / sodium phosphate Powder (PHOS-NaK) 1 Packet(s) Oral three times a day    MEDICATIONS  (PRN):      LABS:                          7.7    6.68  )-----------( 363      ( 17 Mar 2021 07:00 )             28.7     Hemoglobin: 7.7 g/dL (03-17 @ 07:00)  Hemoglobin: 7.9 g/dL (03-16 @ 06:53)  Hemoglobin: 8.8 g/dL (03-15 @ 22:06)  Hemoglobin: 9.0 g/dL (03-15 @ 09:01)    03-17    141  |  94<L>  |  20  ----------------------------<  70  3.7   |  33<H>  |  0.81    Ca    9.5      17 Mar 2021 06:57  Phos  2.1     03-17  Mg     2.0     03-15    TPro  6.5  /  Alb  3.4  /  TBili  0.6  /  DBili  x   /  AST  24  /  ALT  21  /  AlkPhos  56  03-16    Creatinine Trend: 0.81<--, 0.96<--, 0.71<--, 0.76<--             03-16-21 @ 07:01  -  03-17-21 @ 07:00  --------------------------------------------------------  IN: 490 mL / OUT: 300 mL / NET: 190 mL    03-17-21 @ 07:01  -  03-17-21 @ 14:26  --------------------------------------------------------  IN: 300 mL / OUT: 0 mL / NET: 300 mL        PHYSICAL EXAM  Vital Signs Last 24 Hrs  T(C): 36.8 (17 Mar 2021 13:09), Max: 37.2 (16 Mar 2021 20:49)  T(F): 98.2 (17 Mar 2021 13:09), Max: 99 (16 Mar 2021 20:49)  HR: 103 (17 Mar 2021 13:09) (91 - 108)  BP: 103/71 (17 Mar 2021 13:09) (102/64 - 115/74)  BP(mean): --  RR: 18 (17 Mar 2021 13:09) (18 - 18)  SpO2: 84% (17 Mar 2021 13:19) (84% - 100%)      General: Well nourished in no acute distress. Alert and Oriented * 3.   Head: Normocephalic and atraumatic.   Neck: No JVD. No bruits. Supple. Does not appear to be enlarged.   Cardiovascular: + S1,S2 ; RRR Soft systolic murmur at the left lower sternal border. No rubs noted.    Lungs: CTA b/l. No rhonchi, rales or wheezes.   Abdomen: + BS, soft. Non tender. Non distended. No rebound. No guarding.   Extremities: No clubbing/cyanosis/edema.   Neurologic: Moves all four extremities. Full range of motion.   Skin: Warm and moist. The patient's skin has normal elasticity and good skin turgor.   Psychiatric: Appropriate mood and affect.  Musculoskeletal: Normal range of motion, normal strength    TELEMETRY: SR/ST 90-100s    ECG: Sinus tach 102 BPM    < from: TTE with Doppler (w/Cont) (03.17.21 @ 07:51) >  Conclusions:  1. Normal mitral valve. Minimal mitral regurgitation.  2. Normal trileaflet aortic valve. No aortic valve  regurgitation seen.  3. Endocardial visualization enhanced with intravenous  injection of Ultrasonic Enhancing Agent (Definity). Normal  left ventricular systolic function. No segmental wall  motion abnormalities.  4. The right ventricle is not well visualized on axis;  normal right ventricular systolic function. The right  ventricle appears mildly enlarged on certain views; may be  due to off-axis imaging.  *** No previous Echo exam.    < end of copied text >      RADIOLOGY:      CTA: (-) PA dilated PA    ASSESSMENT/PLAN: 45y Female admitted with insomnia, SOB, and hypercarbia found with Dilated PA 9-) PE on CTPA    - No pulmonary edema on CTA, no fluid overload on exam  - LE doppler neg for DVT  - Suspect primary pulmonary process  - Pulmonary f/u  - Renal eval appreciated - agree with holding lasix  - TTE with normal LV/RV function, normal pulm pressures  - No further inpatient cardiac w/u expected  - Patient to follow up in our [place] office with Dr. Najera on 3/31 at 10:20 AM ([address], [address], [place] [address] - Office #[phone])    Janak Langley PA-C  Pager: [phone]

## 2021-03-17 NOTE — PROGRESS NOTE ADULT - SUBJECTIVE AND OBJECTIVE BOX
Patient is a 45y old  Female who presents with a chief complaint of Hypercapnic Respiratory Failure (16 Mar 2021 00:00)      HPI:  DATE OF SERVICE: 21 @ 22:59  Awake and alert  Tolerated AVAPs overnight  Feeling much better      PAST MEDICAL & SURGICAL HISTORY:  Fibroid    H/O:     H/O myomectomy        Review of Systems:   CONSTITUTIONAL: No fever, weight loss, or fatigue  EYES: No eye pain, visual disturbances, or discharge  ENMT:  No difficulty hearing, tinnitus, vertigo; No sinus or throat pain  NECK: No pain or stiffness  BREASTS: No pain, masses, or nipple discharge  RESPIRATORY: No cough, wheezing, chills or hemoptysis; +shortness of breath  CARDIOVASCULAR: No chest pain, palpitations, dizziness, or leg swelling  GASTROINTESTINAL: No abdominal or epigastric pain. No nausea, vomiting, or hematemesis; No diarrhea or constipation. No melena or hematochezia.  GENITOURINARY: No dysuria, frequency, hematuria, or incontinence  NEUROLOGICAL: No headaches, memory loss, loss of strength, numbness, or tremors  SKIN: No itching, burning, rashes, or lesions   LYMPH NODES: No enlarged glands  ENDOCRINE: No heat or cold intolerance; No hair loss  MUSCULOSKELETAL: No joint pain or swelling; No muscle, back, or extremity pain  PSYCHIATRIC: No depression, anxiety, mood swings, or difficulty sleeping  HEME/LYMPH: No easy bruising, or bleeding gums  ALLERY AND IMMUNOLOGIC: No hives or eczema    Allergies    No Known Allergies    Intolerances        Social History:     FAMILY HISTORY:      MEDICATIONS  (STANDING):  furosemide   Injectable 40 milliGRAM(s) IV Push daily  heparin   Injectable 5000 Unit(s) SubCutaneous every 12 hours    MEDICATIONS  (PRN):        CAPILLARY BLOOD GLUCOSE      POCT Blood Glucose.: 120 mg/dL (15 Mar 2021 21:44)    I&O's Summary    15 Mar 2021 07:  -  16 Mar 2021 07:00  --------------------------------------------------------  IN: 0 mL / OUT: 600 mL / NET: -600 mL    16 Mar 2021 07:01  -  16 Mar 2021 16:25  --------------------------------------------------------  IN: 120 mL / OUT: 300 mL / NET: -180 mL        PHYSICAL EXAM:  Vital Signs Last 24 Hrs  T(C): 37.4 (16 Mar 2021 11:54), Max: 37.6 (15 Mar 2021 22:53)  T(F): 99.4 (16 Mar 2021 11:54), Max: 99.6 (15 Mar 2021 22:53)  HR: 91 (16 Mar 2021 15:30) (89 - 111)  BP: 107/55 (16 Mar 2021 11:54) (103/66 - 126/77)  BP(mean): --  RR: 18 (16 Mar 2021 15:30) (18 - 20)  SpO2: 94% (16 Mar 2021 15:30) (92% - 100%)    GENERAL: NAD, well-developed  HEAD:  Atraumatic, Normocephalic  EYES: EOMI, PERRLA, conjunctiva and sclera clear  NECK: Supple, No JVD  CHEST/LUNG: Clear to auscultation bilaterally; No wheeze  HEART: Regular rate and rhythm; No murmurs, rubs, or gallops  ABDOMEN: Soft, Nontender, Nondistended; Bowel sounds present  EXTREMITIES:  2+ Peripheral Pulses, No clubbing, cyanosis, or edema  PSYCH: AAOx3  NEUROLOGY: non-focal  SKIN: No rashes or lesions    LABS:                        7.9    8.24  )-----------( 423      ( 16 Mar 2021 06:53 )             30.8     03-16    139  |  91<L>  |  14  ----------------------------<  70  4.8   |  34<H>  |  0.96    Ca    9.5      16 Mar 2021 06:52  Phos  5.1     03-15  Mg     2.0     03-15    TPro  6.5  /  Alb  3.4  /  TBili  0.6  /  DBili  x   /  AST  24  /  ALT  21  /  AlkPhos  56  03-16    PT/INR - ( 15 Mar 2021 09:01 )   PT: 13.3 sec;   INR: 1.11 ratio         PTT - ( 15 Mar 2021 09:01 )  PTT:27.4 sec      Urinalysis Basic - ( 16 Mar 2021 06:52 )    Color: Yellow / Appearance: Slightly Turbid / S.020 / pH: x  Gluc: x / Ketone: Negative  / Bili: Negative / Urobili: Negative   Blood: x / Protein: 30 mg/dL / Nitrite: Negative   Leuk Esterase: Large / RBC: 0 /hpf / WBC 11 /HPF   Sq Epi: x / Non Sq Epi: 8 /hpf / Bacteria: Negative        RADIOLOGY & ADDITIONAL TESTS:    Imaging Personally Reviewed:    Consultant(s) Notes Reviewed:      Care Discussed with Consultants/Other Providers:

## 2021-03-17 NOTE — PROGRESS NOTE ADULT - ASSESSMENT
46 y/o female with PMHx fibroids s/p myomectomy x2 PSx  works from home now presenting to the ED with SOB x2 weeks and anxiety causing insomnia x2 weeks. Patient reported she cannot sleep more than 1-2 hours per night and is now falling asleep mid sentence slurring her words. Patient tried to take valerian root with no improvement of sleep. Patient has recent stressor of moving. Patient also feels like she cannot take a deep breath and catch her breath. Strong family history of lung cancer. Patient denied CP, h/o COVID, abdominal pain, N/V/D, weight loss, rash, fever chills, cough, back pain, dizziness, syncope, palpitations, h/o insomnia  CT Chest did not show PE, but did show dilated pulm artery (15 Mar 2021 12:37)  she is obese and says she has not been sleeping for months: She did try taking over the counter meds for sleeping but to no help: Many years ago > 5 years ago she was in weight loss program and remembers taking Lasix at that time: She did have pe and few years ago and was on Xarelto for 6 months and she says 5that was after the surgery : She had myomectomy before:  She is not taking any OCP as well as weight reduction pills at this time:  She has no underlying medical problems  She denies smoking as well as drugs:   She has one child and her  and she recently moved to her mothers house:   she also tells me that many times she was incoherent where people were not able to understand her:  She denies any other medical problem s  called her mother to get more history: she said that she is very weak and she can not sleep and she has insomnia but during the day she is very sleepy: When she sleep her mouth is always open: She snores loudly: the mother cant tell if she has apneas:   for tow or three years she was sleeping with her in [place] and she was prompted to go to  as she was snoring a lot:   She has HTN while she was pregnant: She is not taking any med for it now:   she is not sure whether she got sob on walking:        ARVIND/ OHS: NO ASTHMA: SHE NEVER SMOKED: Morbid Obesity: Acute on Chr hypercarbic resp failure : She  likely has OHS ( her admission hco3 is 31) and ARVIND: as she is very sleepy and  tired in the daytime and does not sleep well: Her cta is negative for pe though limited and her lung parenchyma looks OK: except some atelectasis at bases: Her PA are enlaced and she also likely has pulmonary hypertension she would need echo to rule out other cardiac diseases as well as pulm htn measurement: She is already  on Lasix. She would need bipap for now and then gradually change to prn in daytime and full time at night time: would increase bipap to 15 and 5  : ABG about 5-6 PM and one in morning: Avoid any narcotics and sedatives as well as benzos   GESTATIONAL HYPERTENSION: Normotensive at this time  LIKELY PULMONARY HYPERTENSION: needs echo  hx of PE: She had pe many years ago after the surgery : CTA at this time is negative though limited: but would also do dopplers:   DVT propahyxlis    3/16:    ARVIND/ OHS: NO ASTHMA: SHE NEVER SMOKED: Morbid Obesity: She was switched to avaps last night by MICU : currently sheis doign pretty good: Todasy ABG with overventilation: has met as wellas resp alkalosis baseline co2 somewhere arround high 50 to low 60:   GESTATIONAL HYPERTENSION: Normotensive at this time  LIKELY PULMONARY HYPERTENSION: needs echo  hx of PE: She had pe many years ago after the surgery : CTA at this time is negative though limited: but would also do dopplers- PENDING  DVT lalayxlis  dw np    3/17:      ARVIND/ OHS: NO ASTHMA: SHE NEVER SMOKED: Morbid Obesity: on avaps: abg pretty good: has chr retention of paco2 has OHS and would be helped if she gets NIV at home: she would needoutpt PSG: her wcho is noted: too: RV is OK: She may also need home o2:   GESTATIONAL HYPERTENSION: Normotensive at this time  LIKELY PULMONARY HYPERTENSION: needs echo  hx of PE: She had pe many years ago after the surgery : CTA at this time is negative though limited: but would also do dopplers- PENDING  DVT lalayxlis  dw np

## 2021-03-17 NOTE — PROGRESS NOTE ADULT - ASSESSMENT
44 y/o female with PMHx fibroids s/p myomectomy x2 PSx , hx of PE, of pre-eclampsia works from home now presenting to the ED with SOB & intermittent confusion x2 weeks and anxiety causing insomnia x2 weeks. Pt was suspected to have OHS/ ARVIND, started on AVAPS & given IV lasix x 2 doses. Nephrology was called due to pt's acid base disturbance. Pt denies any hx of kidney dz in self & family. Takes valerian root extract for insomnia. No blood/pus in urine or frothy urine. No OTC med or recent abx used. No smoke/ etoh/ drugs. Does not take any regular meds. Denies any exogenous alkali intake             Acute on chronic hypercapnic respiratory failure likely from OHS/ ARVIND-   now with overcompensation from AVAPS & metabolic (contraction) alkalosis due to lasix x 2 doses  ABG on admission showed Respiratory acidosis without adequate metabolic compensation  ABG on 3/16/21:    7.42/68/46 consistent with acute on chronic respiratory acidosis (Primary disorder) likely from OHS/ ARVIND + metabolic alkalosis liekly from overcompensation due to AVAPS & addition of lasix (expected PCO2 is 39)  CT Chest did not show PE, but did show dilated pulm artery, likely PHTN from OHS/ ARVIND    1. Resp acidosis with chronic and acute met alk- chronic is compensatory from the resp acidosis. Given low chloride state and drop of pco2 from 90 to 45, likely post hypercapnic met alk vs diuretic related contraction now.   No signs of secondary hyperaldo state but check renin/diego level- and mineralcorticoid excess such as Cushing's possible  Less likely milk alkali syndrome as normocalcemic   Hold diuresis for now and monitor  Plasma renal activity pending, renin plasma levels are not accurate usually, pending diego level  Cortisol normal, less likely Cushing's  Pt appears euvolemic on exam    2. Hyperphos- unclear why. Check again, repeat ionzied calcium, no major MARICHUY or CKD  Hyperphosphatemia improved    Will follow with you

## 2021-03-17 NOTE — PROGRESS NOTE ADULT - SUBJECTIVE AND OBJECTIVE BOX
Date of Service: 21 @ 13:37    Patient is a 45y old  Female who presents with a chief complaint of SOB (16 Mar 2021 19:57)      Any change in ROS: Sheis alert and awake: no SOB :     MEDICATIONS  (STANDING):  furosemide   Injectable 40 milliGRAM(s) IV Push daily  heparin   Injectable 5000 Unit(s) SubCutaneous every 12 hours  potassium phosphate / sodium phosphate Powder (PHOS-NaK) 1 Packet(s) Oral three times a day    MEDICATIONS  (PRN):    Vital Signs Last 24 Hrs  T(C): 36.8 (17 Mar 2021 13:09), Max: 37.2 (16 Mar 2021 20:49)  T(F): 98.2 (17 Mar 2021 13:09), Max: 99 (16 Mar 2021 20:49)  HR: 103 (17 Mar 2021 13:09) (91 - 108)  BP: 103/71 (17 Mar 2021 13:09) (102/64 - 115/74)  BP(mean): --  RR: 18 (17 Mar 2021 13:09) (18 - 18)  SpO2: 84% (17 Mar 2021 13:19) (84% - 100%)    I&O's Summary    16 Mar 2021 07:01  -  17 Mar 2021 07:00  --------------------------------------------------------  IN: 490 mL / OUT: 300 mL / NET: 190 mL    17 Mar 2021 07:01  -  17 Mar 2021 13:37  --------------------------------------------------------  IN: 300 mL / OUT: 0 mL / NET: 300 mL          Physical Exam:   GENERAL: Obese  HEENT: MANSOOR/   Atraumatic, Normocephalic  ENMT: No tonsillar erythema, exudates, or enlargement; Moist mucous membranes, Good dentition, No lesions  NECK: Supple, No JVD, Normal thyroid  CHEST/LUNG: Clear to auscultaion  CVS: Regular rate and rhythm; No murmurs, rubs, or gallops  GI: : Soft, Nontender, Nondistended; Bowel sounds present  NERVOUS SYSTEM:  Alert & Oriented X3  EXTREMITIES:  Mild  edema  LYMPH: No lymphadenopathy noted  SKIN: No rashes or lesions  ENDOCRINOLOGY: No Thyromegaly  PSYCH: Appropriate    Labs:  ABG - ( 17 Mar 2021 10:25 )  pH, Arterial: 7.40  pH, Blood: x     /  pCO2: 69    /  pO2: 83    / HCO3: 42    / Base Excess: 15.9  /  SaO2: 96              36, 38, 38                            7.7    6.68  )-----------( 363      ( 17 Mar 2021 07:00 )             28.7                         7.9    8.24  )-----------( 423      ( 16 Mar 2021 06:53 )             30.8                         8.8    9.46  )-----------( 509      ( 15 Mar 2021 22:06 )             34.9                         9.0    7.91  )-----------( 560      ( 15 Mar 2021 09:01 )             34.7     03-17    141  |  94<L>  |  20  ----------------------------<  70  3.7   |  33<H>  |  0.81  -16    139  |  91<L>  |  14  ----------------------------<  70  4.8   |  34<H>  |  0.96  -15    141  |  94<L>  |  10  ----------------------------<  119<H>  4.4   |  37<H>  |  0.71  03-15    139  |  94<L>  |  12  ----------------------------<  131<H>  3.8   |  32<H>  |  0.76    Ca    9.5      17 Mar 2021 06:57  Ca    9.5      16 Mar 2021 06:52  Ca    9.8      15 Mar 2021 22:06  Phos  2.1     03-17  Phos  5.1     03-15  Mg     2.0     03-15    TPro  6.5  /  Alb  3.4  /  TBili  0.6  /  DBili  x   /  AST  24  /  ALT  21  /  AlkPhos  56  03-16  TPro  7.5  /  Alb  4.1  /  TBili  0.4  /  DBili  x   /  AST  25  /  ALT  25  /  AlkPhos  64  03-15  TPro  7.7  /  Alb  4.0  /  TBili  0.4  /  DBili  x   /  AST  26  /  ALT  21  /  AlkPhos  65  03-15    CAPILLARY BLOOD GLUCOSE          LIVER FUNCTIONS - ( 16 Mar 2021 06:52 )  Alb: 3.4 g/dL / Pro: 6.5 g/dL / ALK PHOS: 56 U/L / ALT: 21 U/L / AST: 24 U/L / GGT: x             Urinalysis Basic - ( 16 Mar 2021 06:52 )    Color: Yellow / Appearance: Slightly Turbid / S.020 / pH: x  Gluc: x / Ketone: Negative  / Bili: Negative / Urobili: Negative   Blood: x / Protein: 30 mg/dL / Nitrite: Negative   Leuk Esterase: Large / RBC: 0 /hpf / WBC 11 /HPF   Sq Epi: x / Non Sq Epi: 8 /hpf / Bacteria: Negative      Serum Pro-Brain Natriuretic Peptide: 1345 pg/mL (03-15 @ 09:01)        RECENT CULTURES:  r< from: CT Head No Cont (21 @ 19:19) >  PROCEDURE DATE:  2021            INTERPRETATION:  Noncontrast CT of the brain.    CLINICAL INDICATION:  Increased lethargy.    TECHNIQUE : Axial CT scanning of the brain was obtained from the skull base to the vertex without the administration of intravenous contrast. Sagittal and coronal reformats were provided.    COMPARISON: CT brain 3/15/2021    FINDINGS:    No hydrocephalus, mass effect, midline shift, acute intracranial hemorrhage, or brainedema.    Visualized paranasal sinuses and mastoid air cells are clear.    IMPRESSION:    No hydrocephalus, acute intracranial hemorrhage, mass effect, or brain edema.    < end of copied text >  < from: VA Duplex Lower Ext Vein Scan, Bilat (21 @ 12:47) >      INTERPRETATION:  CLINICAL INFORMATION: Short of breath, lower extremity paresthesias, rule out DVT.    COMPARISON: A prior examination of the RIGHT lower extremity, dated 2013, showed right soleal vein DVT.    TECHNIQUE: Duplex sonography of the BILATERAL LOWER extremity veins with color and spectral Doppler, with and without compression.    FINDINGS:    RIGHT:  Normal compressibility ofthe RIGHT common femoral, femoral and popliteal veins.  Doppler examination shows normal spontaneous and phasic flow.  No RIGHT calf vein thrombosis is detected.    LEFT:  Normal compressibility of the LEFT common femoral, femoral and popliteal veins.  Doppler examination shows normal spontaneous and phasic flow.  No LEFT calf vein thrombosis is detected.    IMPRESSION:  No evidence of deep venous thrombosis in either lower extremity.    < end of copied text >  < from: CT Angio Chest w/ IV Cont (03.15.21 @ 10:48) >  st: Omnipaque 350 / 60 cc administered / 30 cc discarded.  Oral Contrast: None  Complications: None    PROCEDURE:  CT Angiography of the Chest.  Sagittal and coronal reformats were performed as well as 3D (MIP) reconstructions.    FINDINGS:    LUNGS AND AIRWAYS: Patent central airways.  Bilateral lower lung linear and basilar subsegmental atelectasis.  PLEURA: No pleural effusion.  MEDIASTINUM AND SHAYE: No lymphadenopathy.  VESSELS: No main, right, left, or lobar pulmonary embolism. Evaluation of segmental and subsegmental pulmonary arteries is limited by contrast bolus and respiratory motion. Pulmonary artery is enlarged.  HEART: Heart size is normal. No pericardial effusion.  CHEST WALL AND LOWER NECK: Within normal limits.  VISUALIZED UPPER ABDOMEN: Cholelithiasis.  BONES: Degenerative changes.    IMPRESSION:  No main, right, left, or lobar pulmonary embolism. Evaluation of segmental and subsegmental pulmonary arteries is limited.    Pulmonary artery is mildly enlarged which can be seen in the setting of pulmonary arterial hypertension.      < from: TTE with Doppler (w/Cont) (21 @ 07:51) >  Hemodynamic: Estimated right atrial pressure is 8 mm Hg.  Estimated right ventricular systolic pressure equals 27 mm  Hg, assuming right atrial pressure equals 8 mm Hg,  consistent with normal pulmonary pressures.  ------------------------------------------------------------------------  Conclusions:  1. Normal mitral valve. Minimal mitral regurgitation.  2. Normal trileaflet aortic valve. No aortic valve  regurgitation seen.  3. Endocardial visualization enhanced with intravenous  injection of Ultrasonic Enhancing Agent (Definity). Normal  left ventricular systolic function. No segmental wall  motion abnormalities.  4. The right ventricle is not well visualized on axis;  normal right ventricular systolic function. The right  ventricle appears mildly enlarged on certain views; may be  due to off-axis imaging.  *** No previous Echo exam.  ------------------------------------------------------------------------  Confirmed on  3/17/2021 - 10:00:22 by Beth Bhatt M.D.  ------------------------------------------------------------------------    < end of copied text >          EMANUEL BROWN MD; Resident Interventional Radiology  This document has been electronically signed.  JL KNIGHT MD; Attending Radiologist  This document has been electronicallysigned. Mar 15 2021 11:56AM    < end of copied text >        RESPIRATORY CULTURES:          Studies  Chest X-RAY  CT SCAN Chest   Venous Dopplers: LE:   CT Abdomen  Others

## 2021-03-17 NOTE — CHART NOTE - NSCHARTNOTEFT_GEN_A_CORE
Necessity for home o2    Ms. Agudelo will need o2 for home use secondary to ARVIND/OHS ( G47.33/ E 66.2 ) O2 sat on RA at rest  86%. O2 sat 96% on 2 L nasal cannula.

## 2021-03-18 DIAGNOSIS — E34.9 ENDOCRINE DISORDER, UNSPECIFIED: ICD-10-CM

## 2021-03-18 LAB
ANION GAP SERPL CALC-SCNC: 10 MMOL/L — SIGNIFICANT CHANGE UP (ref 5–17)
BUN SERPL-MCNC: 14 MG/DL — SIGNIFICANT CHANGE UP (ref 7–23)
CALCIUM SERPL-MCNC: 9.4 MG/DL — SIGNIFICANT CHANGE UP (ref 8.4–10.5)
CHLORIDE SERPL-SCNC: 94 MMOL/L — LOW (ref 96–108)
CO2 SERPL-SCNC: 35 MMOL/L — HIGH (ref 22–31)
CREAT SERPL-MCNC: 0.76 MG/DL — SIGNIFICANT CHANGE UP (ref 0.5–1.3)
GLUCOSE SERPL-MCNC: 102 MG/DL — HIGH (ref 70–99)
HCT VFR BLD CALC: 28.9 % — LOW (ref 34.5–45)
HGB BLD-MCNC: 7.6 G/DL — LOW (ref 11.5–15.5)
MCHC RBC-ENTMCNC: 18.5 PG — LOW (ref 27–34)
MCHC RBC-ENTMCNC: 26.3 GM/DL — LOW (ref 32–36)
MCV RBC AUTO: 70.3 FL — LOW (ref 80–100)
NRBC # BLD: 0 /100 WBCS — SIGNIFICANT CHANGE UP (ref 0–0)
PHOSPHATE SERPL-MCNC: 3.6 MG/DL — SIGNIFICANT CHANGE UP (ref 2.5–4.5)
PLATELET # BLD AUTO: 282 K/UL — SIGNIFICANT CHANGE UP (ref 150–400)
POTASSIUM SERPL-MCNC: 3.4 MMOL/L — LOW (ref 3.5–5.3)
POTASSIUM SERPL-SCNC: 3.4 MMOL/L — LOW (ref 3.5–5.3)
RBC # BLD: 4.11 M/UL — SIGNIFICANT CHANGE UP (ref 3.8–5.2)
RBC # FLD: 23.4 % — HIGH (ref 10.3–14.5)
SARS-COV-2 RNA SPEC QL NAA+PROBE: SIGNIFICANT CHANGE UP
SODIUM SERPL-SCNC: 139 MMOL/L — SIGNIFICANT CHANGE UP (ref 135–145)
WBC # BLD: 5.92 K/UL — SIGNIFICANT CHANGE UP (ref 3.8–10.5)
WBC # FLD AUTO: 5.92 K/UL — SIGNIFICANT CHANGE UP (ref 3.8–10.5)

## 2021-03-18 PROCEDURE — 99232 SBSQ HOSP IP/OBS MODERATE 35: CPT | Mod: GC

## 2021-03-18 RX ORDER — ERGOCALCIFEROL 1.25 MG/1
50000 CAPSULE ORAL
Refills: 0 | Status: DISCONTINUED | OUTPATIENT
Start: 2021-03-18 | End: 2021-03-20

## 2021-03-18 RX ORDER — POTASSIUM CHLORIDE 20 MEQ
40 PACKET (EA) ORAL ONCE
Refills: 0 | Status: COMPLETED | OUTPATIENT
Start: 2021-03-18 | End: 2021-03-18

## 2021-03-18 RX ORDER — IRON SUCROSE 20 MG/ML
200 INJECTION, SOLUTION INTRAVENOUS EVERY 24 HOURS
Refills: 0 | Status: COMPLETED | OUTPATIENT
Start: 2021-03-18 | End: 2021-03-19

## 2021-03-18 RX ADMIN — Medication 1 PACKET(S): at 06:53

## 2021-03-18 RX ADMIN — Medication 40 MILLIEQUIVALENT(S): at 09:07

## 2021-03-18 RX ADMIN — NYSTATIN CREAM 1 APPLICATION(S): 100000 CREAM TOPICAL at 17:05

## 2021-03-18 RX ADMIN — HEPARIN SODIUM 5000 UNIT(S): 5000 INJECTION INTRAVENOUS; SUBCUTANEOUS at 06:53

## 2021-03-18 RX ADMIN — NYSTATIN CREAM 1 APPLICATION(S): 100000 CREAM TOPICAL at 09:04

## 2021-03-18 RX ADMIN — HEPARIN SODIUM 5000 UNIT(S): 5000 INJECTION INTRAVENOUS; SUBCUTANEOUS at 17:05

## 2021-03-18 RX ADMIN — IRON SUCROSE 110 MILLIGRAM(S): 20 INJECTION, SOLUTION INTRAVENOUS at 16:18

## 2021-03-18 RX ADMIN — ERGOCALCIFEROL 50000 UNIT(S): 1.25 CAPSULE ORAL at 18:40

## 2021-03-18 NOTE — DIETITIAN INITIAL EVALUATION ADULT. - OTHER INFO
Pt reports good appetite/PO intake in-house; endorses completing >/=75% of meals, which is consistent with RN flowsheets documenting 100% of completed meals.     Pt inquired about what a "healthy diet" would look like. RD provided education on well-balanced meals with a "healthy plate," prioritizing non-starchy vegetables, lean protein, whole grain carbohydrates, portion control, as well as DASH/TLC Diet per preference of Pt.   Pt was receptive to education and made aware that RD is available if she has further questions.    Pt has no current complaints of nausea, vomiting, diarrhea, or constipation. States last BM x1 yesterday.    Current dosing weight: 126.8 Kg (3/15)  Pt reports .3 Kg; states that weight has been stable

## 2021-03-18 NOTE — DIETITIAN INITIAL EVALUATION ADULT. - ADD RECOMMEND
1. Recommend DASH/TLC Diet per Pt preference and in setting of Pt admitted with SOB, followed by Cardiology 2. Continue to provide nutritionally relevant education as appropriate 3. Obtain food preferences, honor as able 4. New BMI sticker placed; will follow-up according to protocol 5. Monitor weight trends, PO intake, GI function, electrolytes, and skin integrity

## 2021-03-18 NOTE — DIETITIAN INITIAL EVALUATION ADULT. - PERTINENT MEDS FT
MEDICATIONS  (STANDING):  heparin   Injectable 5000 Unit(s) SubCutaneous every 12 hours  iron sucrose IVPB 200 milliGRAM(s) IV Intermittent every 24 hours  nystatin Powder 1 Application(s) Topical two times a day    MEDICATIONS  (PRN):

## 2021-03-18 NOTE — DIETITIAN INITIAL EVALUATION ADULT. - ORAL INTAKE PTA/DIET HISTORY
Pt visited at bedside who reports poor PO intake x2 days PTA, consuming <50% of baseline, secondary to "chills" after eating; now improved. Pt states that at baseline she consumes x1-2  meals/day + snacks, likely meeting >/= 75% estimated needs. Pt reports following a normal diet PTA. Denies any difficulties chewing or swallowing. Pt denies taking any vitamins or minerals PTA. Confirms NKFA.

## 2021-03-18 NOTE — CONSULT NOTE ADULT - ASSESSMENT
Assessment  Hyperparathyroidism: 45y Female admitted with SOB and AMS, found to have elevated PTH, Ca WNL, vitamin D-25 hydroxy low. Patient appears alert, comfortable and in no acute distress, reports feeling better/breathing improved.  SOB: workup in progress, on medications and breathing tx, stable, monitored.      Winston Bright MD  Cell: [phone]  Office: [phone]     Assessment  Hyperparathyroidism: 45y Female admitted with SOB and AMS, found to have elevated PTH, Ca WNL, vitamin D-25 hydroxy low. Patient appears alert,  comfortable and in no acute distress, reports feeling better/breathing improved.  SOB: workup in progress, on medications and breathing tx, stable, monitored.      Winston Bright MD  Cell: [phone]  Office: [phone]

## 2021-03-18 NOTE — PROGRESS NOTE ADULT - SUBJECTIVE AND OBJECTIVE BOX
Patient is a 45y old  Female who presents with a chief complaint of Hypercapnic Respiratory Failure (16 Mar 2021 00:00)      HPI:  DATE OF SERVICE: 21 @ 14:29    Awake and alert  Tolerated AVAPs overnight  Feeling much better      PAST MEDICAL & SURGICAL HISTORY:  Fibroid    H/O:     H/O myomectomy        Review of Systems:   CONSTITUTIONAL: No fever, weight loss, or fatigue  EYES: No eye pain, visual disturbances, or discharge  ENMT:  No difficulty hearing, tinnitus, vertigo; No sinus or throat pain  NECK: No pain or stiffness  BREASTS: No pain, masses, or nipple discharge  RESPIRATORY: No cough, wheezing, chills or hemoptysis; +shortness of breath  CARDIOVASCULAR: No chest pain, palpitations, dizziness, or leg swelling  GASTROINTESTINAL: No abdominal or epigastric pain. No nausea, vomiting, or hematemesis; No diarrhea or constipation. No melena or hematochezia.  GENITOURINARY: No dysuria, frequency, hematuria, or incontinence  NEUROLOGICAL: No headaches, memory loss, loss of strength, numbness, or tremors  SKIN: No itching, burning, rashes, or lesions   LYMPH NODES: No enlarged glands  ENDOCRINE: No heat or cold intolerance; No hair loss  MUSCULOSKELETAL: No joint pain or swelling; No muscle, back, or extremity pain  PSYCHIATRIC: No depression, anxiety, mood swings, or difficulty sleeping  HEME/LYMPH: No easy bruising, or bleeding gums  ALLERY AND IMMUNOLOGIC: No hives or eczema    Allergies    No Known Allergies    Intolerances        Social History:     FAMILY HISTORY:      MEDICATIONS  (STANDING):  furosemide   Injectable 40 milliGRAM(s) IV Push daily  heparin   Injectable 5000 Unit(s) SubCutaneous every 12 hours    MEDICATIONS  (PRN):        CAPILLARY BLOOD GLUCOSE      POCT Blood Glucose.: 120 mg/dL (15 Mar 2021 21:44)    I&O's Summary    15 Mar 2021 07:  -  16 Mar 2021 07:00  --------------------------------------------------------  IN: 0 mL / OUT: 600 mL / NET: -600 mL    16 Mar 2021 07:01  -  16 Mar 2021 16:25  --------------------------------------------------------  IN: 120 mL / OUT: 300 mL / NET: -180 mL        PHYSICAL EXAM:  Vital Signs Last 24 Hrs  T(C): 37.4 (16 Mar 2021 11:54), Max: 37.6 (15 Mar 2021 22:53)  T(F): 99.4 (16 Mar 2021 11:54), Max: 99.6 (15 Mar 2021 22:53)  HR: 91 (16 Mar 2021 15:30) (89 - 111)  BP: 107/55 (16 Mar 2021 11:54) (103/66 - 126/77)  BP(mean): --  RR: 18 (16 Mar 2021 15:30) (18 - 20)  SpO2: 94% (16 Mar 2021 15:30) (92% - 100%)    GENERAL: NAD, well-developed  HEAD:  Atraumatic, Normocephalic  EYES: EOMI, PERRLA, conjunctiva and sclera clear  NECK: Supple, No JVD  CHEST/LUNG: Clear to auscultation bilaterally; No wheeze  HEART: Regular rate and rhythm; No murmurs, rubs, or gallops  ABDOMEN: Soft, Nontender, Nondistended; Bowel sounds present  EXTREMITIES:  2+ Peripheral Pulses, No clubbing, cyanosis, or edema  PSYCH: AAOx3  NEUROLOGY: non-focal  SKIN: No rashes or lesions    LABS:                        7.9    8.24  )-----------( 423      ( 16 Mar 2021 06:53 )             30.8     03-16    139  |  91<L>  |  14  ----------------------------<  70  4.8   |  34<H>  |  0.96    Ca    9.5      16 Mar 2021 06:52  Phos  5.1     03-15  Mg     2.0     03-15    TPro  6.5  /  Alb  3.4  /  TBili  0.6  /  DBili  x   /  AST  24  /  ALT  21  /  AlkPhos  56  03-16    PT/INR - ( 15 Mar 2021 09:01 )   PT: 13.3 sec;   INR: 1.11 ratio         PTT - ( 15 Mar 2021 09:01 )  PTT:27.4 sec      Urinalysis Basic - ( 16 Mar 2021 06:52 )    Color: Yellow / Appearance: Slightly Turbid / S.020 / pH: x  Gluc: x / Ketone: Negative  / Bili: Negative / Urobili: Negative   Blood: x / Protein: 30 mg/dL / Nitrite: Negative   Leuk Esterase: Large / RBC: 0 /hpf / WBC 11 /HPF   Sq Epi: x / Non Sq Epi: 8 /hpf / Bacteria: Negative        RADIOLOGY & ADDITIONAL TESTS:    Imaging Personally Reviewed:    Consultant(s) Notes Reviewed:      Care Discussed with Consultants/Other Providers:

## 2021-03-18 NOTE — PROGRESS NOTE ADULT - ASSESSMENT
46 y/o female with PMHx fibroids s/p myomectomy x2 PSx , hx of PE, of pre-eclampsia works from home now presenting to the ED with SOB & intermittent confusion x2 weeks and anxiety causing insomnia x2 weeks. Pt was suspected to have OHS/ ARVIND, started on AVAPS & given IV lasix x 2 doses. Nephrology was called due to pt's acid base disturbance. Pt denies any hx of kidney dz in self & family. Takes valerian root extract for insomnia. No blood/pus in urine or frothy urine. No OTC med or recent abx used. No smoke/ etoh/ drugs. Does not take any regular meds. Denies any exogenous alkali intake       Acute on chronic hypercapnic respiratory failure likely from OHS/ ARVIND-   now with overcompensation from AVAPS & metabolic (contraction) alkalosis due to lasix x 2 doses  ABG on admission showed Respiratory acidosis without adequate metabolic compensation  ABG on 3/16/21:    7.42/68/46 consistent with acute on chronic respiratory acidosis (Primary disorder) likely from OHS/ ARVIND + metabolic alkalosis liekly from overcompensation due to AVAPS & addition of lasix (expected PCO2 is 39)  CT Chest did not show PE, but did show dilated pulm artery, likely PHTN from OHS/ ARVIND    1. Resp acidosis with chronic and acute met alk- chronic is compensatory from the resp acidosis. Given low chloride state and drop of pco2 from 90 to 45, likely post hypercapnic met alk vs diuretic related contraction now.   No signs of secondary hyperaldo state but check renin/diego level- and mineralcorticoid excess such as Cushing's possible  Less likely milk alkali syndrome as normocalcemic   Plasma renal activity pending, renin plasma levels are not accurate usually, pending diego level  Cortisol normal, less likely Cushing's  Pt appears euvolemic on exam, no diuresis    2. Elevated PTH- this is likely in setting of severe vitamin D def, can replete ergocalciferol 50,000 units once a week for 8 weeks.   Normo-calcemic primary PTH disease can happen but would see the trend first post treatment. Renal function is normal so less likely secondary PTH and 1,25 vit is normal  Endo eval may be needed.      Will follow with you

## 2021-03-18 NOTE — DIETITIAN INITIAL EVALUATION ADULT. - OTHER CALCULATIONS
Estimated nutrient needs with considerations for BMI 40.1 Kg/m2, estimated calories based on dosing weight of 126.8 Kg, estimated protein needs based on upper IBW of 74.8 Kg, and fluids per team.

## 2021-03-18 NOTE — CHART NOTE - NSCHARTNOTEFT_GEN_A_CORE
Patient with chronic respiratory failure will require non-invasive ventilation with AVAPS-AE. Despite Bipap use, CO2 on ABG remains high according to the results. Without non-invasive ventilation, patient will continue to have high PCO2 levels and possible readmission.    Minda Ceballos AGACNP-c

## 2021-03-18 NOTE — PROGRESS NOTE ADULT - ASSESSMENT
From: Elijah Epps  To: Marquis Nava DO  Sent: 7/2/2019 3:20 PM CDT  Subject: Non-Urgent Medical Question    Marquis,     Is it ok for me to take an over the counter allergy med like Claritin while on Lisinopri/HCTZ 10-12.5mg for high blood pressure? I seem to remember some issue with it. The [place]wood cotton has been especially bothersome to me this season for some reason and I could use some relief from itchy watery eyes and sneezing fits.     Thanks,    Elijah Epps   44 y/o female with PMHx fibroids s/p myomectomy x2 PSx  works from home now presenting to the ED with SOB x2 weeks and anxiety causing insomnia x2 weeks. Patient reported she cannot sleep more than 1-2 hours per night and is now falling asleep mid sentence slurring her words. Patient tried to take valerian root with no improvement of sleep. Patient has recent stressor of moving. Patient also feels like she cannot take a deep breath and catch her breath. Strong family history of lung cancer. Patient denied CP, h/o COVID, abdominal pain, N/V/D, weight loss, rash, fever chills, cough, back pain, dizziness, syncope, palpitations, h/o insomnia  CT Chest did not show PE, but did show dilated pulm artery (15 Mar 2021 12:37)  she is obese and says she has not been sleeping for months: She did try taking over the counter meds for sleeping but to no help: Many years ago > 5 years ago she was in weight loss program and remembers taking Lasix at that time: She did have pe and few years ago and was on Xarelto for 6 months and she says 5that was after the surgery : She had myomectomy before:  She is not taking any OCP as well as weight reduction pills at this time:  She has no underlying medical problems  She denies smoking as well as drugs:   She has one child and her  and she recently moved to her mothers house:   she also tells me that many times she was incoherent where people were not able to understand her:  She denies any other medical problem s  called her mother to get more history: she said that she is very weak and she can not sleep and she has insomnia but during the day she is very sleepy: When she sleep her mouth is always open: She snores loudly: the mother cant tell if she has apneas:   for tow or three years she was sleeping with her in [place] and she was prompted to go to  as she was snoring a lot:   She has HTN while she was pregnant: She is not taking any med for it now:   she is not sure whether she got sob on walking:        ARVIND/ OHS: NO ASTHMA: SHE NEVER SMOKED: Morbid Obesity: Acute on Chr hypercarbic resp failure : She  likely has OHS ( her admission hco3 is 31) and ARVIND: as she is very sleepy and  tired in the daytime and does not sleep well: Her cta is negative for pe though limited and her lung parenchyma looks OK: except some atelectasis at bases: Her PA are enlaced and she also likely has pulmonary hypertension she would need echo to rule out other cardiac diseases as well as pulm htn measurement: She is already  on Lasix. She would need bipap for now and then gradually change to prn in daytime and full time at night time: would increase bipap to 15 and 5  : ABG about 5-6 PM and one in morning: Avoid any narcotics and sedatives as well as benzos   GESTATIONAL HYPERTENSION: Normotensive at this time  LIKELY PULMONARY HYPERTENSION: needs echo  hx of PE: She had pe many years ago after the surgery : CTA at this time is negative though limited: but would also do dopplers:   DVT propahyxlis    3/16:    ARVIND/ OHS: NO ASTHMA: SHE NEVER SMOKED: Morbid Obesity: She was switched to avaps last night by MICU : currently sheis doign pretty good: Todasy ABG with overventilation: has met as wellas resp alkalosis baseline co2 somewhere arround high 50 to low 60:   GESTATIONAL HYPERTENSION: Normotensive at this time  LIKELY PULMONARY HYPERTENSION: needs echo  hx of PE: She had pe many years ago after the surgery : CTA at this time is negative though limited: but would also do dopplers- PENDING  DVT propahyxlis  dw np    3/17:      ARVIND/ OHS: NO ASTHMA: SHE NEVER SMOKED: Morbid Obesity: on avaps: abg pretty good: has chr retention of paco2 has OHS and would be helped if she gets NIV at home: she would needoutpt PSG: her wcho is noted: too: RV is OK: She may also need home o2:   GESTATIONAL HYPERTENSION: Normotensive at this time  LIKELY PULMONARY HYPERTENSION: needs echo  hx of PE: She had pe many years ago after the surgery : CTA at this time is negative though limited: but would also do dopplers- PENDING  DVT propahyxlis  dw np    3/18:    ARVIND/ OHS: NO ASTHMA: SHE NEVER SMOKED: Morbid Obesity: ac on chr hypercapnic resp fialure on avaps: abg pretty good: has chr retention of paco2 has OHS and would be helped if she gets NIV at home: she would need outpt PSG: her echo is noted: too: RV is OK: She may also need home o2:   GESTATIONAL HYPERTENSION: Normotensive at this time  LIKELY PULMONARY HYPERTENSION: needs echo  hx of PE: She had pe many years ago after the surgery : CTA at this time is negative though limited: but would also do dopplers- negative  dw np

## 2021-03-18 NOTE — PROGRESS NOTE ADULT - SUBJECTIVE AND OBJECTIVE BOX
Date of Service: 03-18-21 @ 14:24    Patient is a 45y old  Female who presents with a chief complaint of SOB (16 Mar 2021 19:57)      Any change in ROS: She is alert and awake: no SOB :     MEDICATIONS  (STANDING):  heparin   Injectable 5000 Unit(s) SubCutaneous every 12 hours  iron sucrose IVPB 200 milliGRAM(s) IV Intermittent every 24 hours  nystatin Powder 1 Application(s) Topical two times a day    MEDICATIONS  (PRN):    Vital Signs Last 24 Hrs  T(C): 36.7 (18 Mar 2021 12:12), Max: 36.8 (18 Mar 2021 08:57)  T(F): 98 (18 Mar 2021 12:12), Max: 98.3 (18 Mar 2021 08:57)  HR: 91 (18 Mar 2021 12:12) (86 - 100)  BP: 103/70 (18 Mar 2021 12:12) (103/70 - 123/86)  BP(mean): --  RR: 18 (18 Mar 2021 12:12) (18 - 19)  SpO2: 98% (18 Mar 2021 12:12) (96% - 100%)    I&O's Summary    17 Mar 2021 07:01  -  18 Mar 2021 07:00  --------------------------------------------------------  IN: 860 mL / OUT: 0 mL / NET: 860 mL          Physical Exam:   GENERAL: Obese  HEENT: MANSOOR/   Atraumatic, Normocephalic  ENMT: No tonsillar erythema, exudates, or enlargement; Moist mucous membranes, Good dentition, No lesions  NECK: Supple, No JVD, Normal thyroid  CHEST/LUNG: Clear to auscultaion, ; No rales, rhonchi, wheezing, or rubs  CVS: Regular rate and rhythm; No murmurs, rubs, or gallops  GI: : Soft, Nontender, Nondistended; Bowel sounds present  NERVOUS SYSTEM:  Alert & Oriented X3  EXTREMITIES:  Minimal edema  LYMPH: No lymphadenopathy noted  SKIN: No rashes or lesions  ENDOCRINOLOGY: No Thyromegaly  PSYCH: Appropriate    Labs:  ABG - ( 17 Mar 2021 10:25 )  pH, Arterial: 7.40  pH, Blood: x     /  pCO2: 69    /  pO2: 83    / HCO3: 42    / Base Excess: 15.9  /  SaO2: 96              36, 38, 38                            7.6    5.92  )-----------( 282      ( 18 Mar 2021 05:19 )             28.9                         7.8    7.59  )-----------( 321      ( 17 Mar 2021 16:16 )             29.9                         7.7    6.68  )-----------( 363      ( 17 Mar 2021 07:00 )             28.7                         7.9    8.24  )-----------( 423      ( 16 Mar 2021 06:53 )             30.8                         8.8    9.46  )-----------( 509      ( 15 Mar 2021 22:06 )             34.9                         9.0    7.91  )-----------( 560      ( 15 Mar 2021 09:01 )             34.7     03-18    139  |  94<L>  |  14  ----------------------------<  102<H>  3.4<L>   |  35<H>  |  0.76  03-17    141  |  94<L>  |  20  ----------------------------<  70  3.7   |  33<H>  |  0.81  03-16    139  |  91<L>  |  14  ----------------------------<  70  4.8   |  34<H>  |  0.96  03-15    141  |  94<L>  |  10  ----------------------------<  119<H>  4.4   |  37<H>  |  0.71  03-15    139  |  94<L>  |  12  ----------------------------<  131<H>  3.8   |  32<H>  |  0.76    Ca    9.4      18 Mar 2021 05:19  Ca    9.5      17 Mar 2021 06:57  Phos  3.6     03-18  Phos  2.1     03-17    TPro  6.5  /  Alb  3.4  /  TBili  0.6  /  DBili  x   /  AST  24  /  ALT  21  /  AlkPhos  56  03-16  TPro  7.5  /  Alb  4.1  /  TBili  0.4  /  DBili  x   /  AST  25  /  ALT  25  /  AlkPhos  64  03-15  TPro  7.7  /  Alb  4.0  /  TBili  0.4  /  DBili  x   /  AST  26  /  ALT  21  /  AlkPhos  65  03-15    CAPILLARY BLOOD GLUCOSE        < from: CT Head No Cont (03.16.21 @ 19:19) >  PROCEDURE DATE:  03/16/2021            INTERPRETATION:  Noncontrast CT of the brain.    CLINICAL INDICATION:  Increased lethargy.    TECHNIQUE : Axial CT scanning of the brain was obtained from the skull base to the vertex without the administration of intravenous contrast. Sagittal and coronal reformats were provided.    COMPARISON: CT brain 3/15/2021    FINDINGS:    No hydrocephalus, mass effect, midline shift, acute intracranial hemorrhage, or brainedema.    Visualized paranasal sinuses and mastoid air cells are clear.    IMPRESSION:    No hydrocephalus, acute intracranial hemorrhage, mass effect, or brain edema.    < end of copied text >  < from: VA Duplex Lower Ext Vein Scan, Bilat (03.16.21 @ 12:47) >  INTERPRETATION:  CLINICAL INFORMATION: Short of breath, lower extremity paresthesias, rule out DVT.    COMPARISON: A prior examination of the RIGHT lower extremity, dated 6/16/2013, showed right soleal vein DVT.    TECHNIQUE: Duplex sonography of the BILATERAL LOWER extremity veins with color and spectral Doppler, with and without compression.    FINDINGS:    RIGHT:  Normal compressibility ofthe RIGHT common femoral, femoral and popliteal veins.  Doppler examination shows normal spontaneous and phasic flow.  No RIGHT calf vein thrombosis is detected.    LEFT:  Normal compressibility of the LEFT common femoral, femoral and popliteal veins.  Doppler examination shows normal spontaneous and phasic flow.  No LEFT calf vein thrombosis is detected.    IMPRESSION:  No evidence of deep venous thrombosis in either lower extremity.    < end of copied text >  < from: CT Angio Chest w/ IV Cont (03.15.21 @ 10:48) >  INTERPRETATION:  CLINICAL INFORMATION: Shortness of breath for one week. Evaluate for pulmonary embolism.    COMPARISON: None.    CONTRAST/COMPLICATIONS:  IV Contrast: Omnipaque 350 / 60 cc administered / 30 cc discarded.  Oral Contrast: None  Complications: None    PROCEDURE:  CT Angiography of the Chest.  Sagittal and coronal reformats were performed as well as 3D (MIP) reconstructions.    FINDINGS:    LUNGS AND AIRWAYS: Patent central airways.  Bilateral lower lung linear and basilar subsegmental atelectasis.  PLEURA: No pleural effusion.  MEDIASTINUM AND SHAYE: No lymphadenopathy.  VESSELS: No main, right, left, or lobar pulmonary embolism. Evaluation of segmental and subsegmental pulmonary arteries is limited by contrast bolus and respiratory motion. Pulmonary artery is enlarged.  HEART: Heart size is normal. No pericardial effusion.  CHEST WALL AND LOWER NECK: Within normal limits.  VISUALIZED UPPER ABDOMEN: Cholelithiasis.  BONES: Degenerative changes.    IMPRESSION:  No main, right, left, or lobar pulmonary embolism. Evaluation of segmental and subsegmental pulmonary arteries is limited.    Pulmonary artery is mildly enlarged which can be seen in the setting of pulmonary arterial hypertension.              EMANUEL BROWN MD; Resident Interventional Radiology  This document has been electronically signed.  JL KNIGHT MD; Attending Radiologist  This document has been electronicallysigned. Mar 15 2021 11:56AM    < end of copied text >                RECENT CULTURES:        RESPIRATORY CULTURES:          Studies  Chest X-RAY  CT SCAN Chest   Venous Dopplers: LE:   CT Abdomen  Others

## 2021-03-18 NOTE — CONSULT NOTE ADULT - PROBLEM SELECTOR RECOMMENDATION 9
Patient with elevated PTH, normal calcium, vitamin D25 deficiency can be contributing to elevated PTH.  Suggest to replenish vit D: Ergocalciferol 50,000 units weekly and repeat PTH level once vitD is in normal range.  Will continue monitoring and FU.  Discussed plan with patient.

## 2021-03-18 NOTE — DIETITIAN INITIAL EVALUATION ADULT. - ENTER TO (G/KG)
November 13, 2018                 [facility] - OB/ GYN  Obstetrics and Gynecology  [address] [facility] [address]  [place] [address]  Phone: [phone]  Fax: [phone]   November 13, 2018     Patient: Glen Tyson   YOB: 1996   Date of Visit: 11/13/2018       To Whom it May Concern:    Glen Tyson was seen in my clinic on 11/13/2018. She may return to work 11/13/18 with no restrictions.     If you have any questions or concerns, please don't hesitate to call.    Sincerely,         Luann Beck CNM      1.4

## 2021-03-18 NOTE — CONSULT NOTE ADULT - SUBJECTIVE AND OBJECTIVE BOX
HPI:  44 y/o female with PMHx fibroids s/p myomectomy x2 PSx  works from home now presenting to the ED with SOB x2 weeks and anxiety causing insomnia x2 weeks. Patient reported she cannot sleep more than 1-2 hours per night and is now falling asleep mid sentence slurring her words. Patient tried to take valerian root with no improvement of sleep. Patient has recent stressor of moving. Patient also feels like she cannot take a deep breath and catch her breath. Strong family history of lung cancer. Patient denied CP, h/o COVID, abdominal pain, N/V/D, weight loss, rash, fever chills, cough, back pain, dizziness, syncope, palpitations, h/o insomnia  CT Chest did not show PE, but did show dilated pulm artery (15 Mar 2021 12:37)  Patient with elevated PTH; Endo was consulted for further management and recommendations.    PAST MEDICAL & SURGICAL HISTORY:  Fibroid    H/O:     H/O myomectomy        FAMILY HISTORY:      Social History:    Outpatient Medications:    MEDICATIONS  (STANDING):  heparin   Injectable 5000 Unit(s) SubCutaneous every 12 hours  iron sucrose IVPB 200 milliGRAM(s) IV Intermittent every 24 hours  nystatin Powder 1 Application(s) Topical two times a day    MEDICATIONS  (PRN):      Allergies    No Known Allergies    Intolerances      Review of Systems:  Constitutional: No fever, no chills  Eyes: No blurry vision  Neuro: No tremors  HEENT: No pain, no neck swelling  Cardiovascular: No chest pain, no palpitations  Respiratory: Has SOB, no cough  GI: No nausea, vomiting, abdominal pain  : No dysuria  Skin: no rash  MSK: Has leg swelling.  Psych: no depression  Endocrine: no polyuria, polydipsia    ALL OTHER SYSTEMS REVIEWED AND NEGATIVE    UNABLE TO OBTAIN    PHYSICAL EXAM:  VITALS: T(C): 36.7 (21 @ 12:12)  T(F): 98 (21 @ 12:12), Max: 98.3 (21 @ 08:57)  HR: 91 (21 @ 12:12) (86 - 100)  BP: 103/70 (21 @ 12:12) (103/70 - 123/86)  RR:  (18 - 19)  SpO2:  (96% - 100%)  Wt(kg): --  GENERAL: NAD, well-groomed, well-developed  EYES: No proptosis, no lid lag  HEENT:  Atraumatic, Normocephalic  THYROID: Normal size, no palpable nodules  RESPIRATORY: Clear to auscultation bilaterally; No rales, rhonchi, wheezing  CARDIOVASCULAR: Si S2, No murmurs;  GI: Soft, non distended, normal bowel sounds  SKIN: Dry, intact, No rashes or lesions  MUSCULOSKELETAL: Has BL lower extremity edema.  NEURO:  no tremor, sensation decreased in feet BL,    POCT Blood Glucose.: 120 mg/dL (03-15-21 @ 21:44)                            7.6    5.92  )-----------( 282      ( 18 Mar 2021 05:19 )             28.9       -18    139  |  94<L>  |  14  ----------------------------<  102<H>  3.4<L>   |  35<H>  |  0.76    EGFR if : 111  EGFR if non : 95    Ca    9.4      -18  Mg     2.0     03-15  Phos  3.6     -18    TPro  6.5  /  Alb  3.4  /  TBili  0.6  /  DBili  x   /  AST  24  /  ALT  21  /  AlkPhos  56  03-16      Thyroid Function Tests:  03-15 @ 12:07 TSH 2.33 FreeT4 -- T3 -- Anti TPO -- Anti Thyroglobulin Ab -- TSI --              Radiology:                HPI:  46 y/o female with PMHx fibroids s/p myomectomy x2 PSx  works from home now presenting to the ED with SOB x2 weeks and anxiety causing insomnia x2 weeks. Patient reported she cannot sleep more than 1-2 hours per night and is now falling asleep mid sentence slurring her words. Patient tried to take valerian root with no improvement of sleep. Patient has recent stressor of moving. Patient also feels like she cannot take a deep breath and catch her breath. Strong family history of lung cancer. Patient denied CP, h/o COVID, abdominal pain, N/V/D, weight loss, rash, fever chills, cough, back pain, dizziness, syncope, palpitations, h/o insomnia  CT Chest did not show PE, but did show dilated pulm artery (15 Mar 2021 12:37)  Patient with elevated PTH; Endo was consulted for further management and recommendations.    PAST MEDICAL & SURGICAL HISTORY:  Fibroid    H/O:     H/O myomectomy        FAMILY HISTORY:      Social History:    Outpatient Medications:    MEDICATIONS  (STANDING):  heparin   Injectable 5000 Unit(s) SubCutaneous every 12 hours  iron sucrose IVPB 200 milliGRAM(s) IV Intermittent every 24 hours  nystatin Powder 1 Application(s) Topical two times a day    MEDICATIONS  (PRN):      Allergies    No Known Allergies    Intolerances      Review of Systems:  Constitutional: No fever, no chills  Eyes: No blurry vision  Neuro: No tremors  HEENT: No pain, no neck swelling  Cardiovascular: No chest pain, no palpitations  Respiratory: Has SOB, no cough  GI: No nausea, vomiting, abdominal pain  : No dysuria  Skin: no rash  MSK: Has leg swelling.  Psych: no depression  Endocrine: no polyuria, polydipsia    ALL OTHER SYSTEMS REVIEWED AND NEGATIVE    UNABLE TO OBTAIN    PHYSICAL EXAM:  VITALS: T(C): 36.7 (21 @ 12:12)  T(F): 98 (21 @ 12:12), Max: 98.3 (21 @ 08:57)  HR: 91 (21 @ 12:12) (86 - 100)  BP: 103/70 (21 @ 12:12) (103/70 - 123/86)  RR:  (18 - 19)  SpO2:  (96% - 100%)  Wt(kg): --  GENERAL: NAD, well-groomed, well-developed  EYES: No proptosis, no lid lag  HEENT:  Atraumatic, Normocephalic  THYROID: Normal size, no palpable nodules  RESPIRATORY: Clear to auscultation bilaterally; No rales, rhonchi, wheezing  CARDIOVASCULAR: Si S2, No murmurs;  GI: Soft, non distended, normal bowel sounds  SKIN: Dry, intact, No rashes or lesions  MUSCULOSKELETAL: Has BL lower extremity edema.  NEURO:  no tremor, sensation decreased in feet BL,    POCT Blood Glucose.: 120 mg/dL (03-15-21 @ 21:44)                            7.6    5.92  )-----------( 282      ( 18 Mar 2021 05:19 )             28.9       -18    139  |  94<L>  |  14  ----------------------------<  102<H>  3.4<L>   |  35<H>  |  0.76    EGFR if : 111  EGFR if non : 95    Ca    9.4      -18  Mg     2.0     03-15  Phos  3.6     -18    TPro  6.5  /  Alb  3.4  /  TBili  0.6  /  DBili  x   /  AST  24  /  ALT  21  /  AlkPhos  56  03-16      Thyroid Function Tests:  03-15 @ 12:07 TSH 2.33 FreeT4 -- T3 -- Anti TPO -- Anti Thyroglobulin Ab -- TSI --              Radiology:

## 2021-03-18 NOTE — PROGRESS NOTE ADULT - SUBJECTIVE AND OBJECTIVE BOX
[facility] DIVISION OF KIDNEY DISEASES AND HYPERTENSION -- FOLLOW UP NOTE  --------------------------------------------------------------------------------  Chief Complaint:    24 hour events/subjective:  Labs improvig      PAST HISTORY  --------------------------------------------------------------------------------  No significant changes to PMH, PSH, FHx, SHx, unless otherwise noted    ALLERGIES & MEDICATIONS  --------------------------------------------------------------------------------  Allergies    No Known Allergies    Intolerances      Standing Inpatient Medications  heparin   Injectable 5000 Unit(s) SubCutaneous every 12 hours  nystatin Powder 1 Application(s) Topical two times a day    PRN Inpatient Medications      REVIEW OF SYSTEMS  --------------------------------------------------------------------------------  Gen: No weight changes, fatigue, fevers/chills, weakness  Skin: No rashes  Head/Eyes/Ears/Mouth: No headache; Normal hearing; Normal vision w/o blurriness; No sinus pain/discomfort, sore throat  Respiratory: No dyspnea, cough, wheezing, hemoptysis  CV: No chest pain, PND, orthopnea  GI: No abdominal pain, diarrhea, constipation, nausea, vomiting, melena, hematochezia  : No increased frequency, dysuria, hematuria, nocturia  MSK: No joint pain/swelling; no back pain; no edema  Neuro: No dizziness/lightheadedness, weakness, seizures, numbness, tingling  Heme: No easy bruising or bleeding  Endo: No heat/cold intolerance  Psych: No significant nervousness, anxiety, stress, depression    All other systems were reviewed and are negative, except as noted.    VITALS/PHYSICAL EXAM  --------------------------------------------------------------------------------  T(C): 36.7 (03-18-21 @ 12:12), Max: 36.8 (03-18-21 @ 08:57)  HR: 91 (03-18-21 @ 12:12) (86 - 100)  BP: 103/70 (03-18-21 @ 12:12) (103/70 - 123/86)  RR: 18 (03-18-21 @ 12:12) (18 - 19)  SpO2: 98% (03-18-21 @ 12:12) (96% - 100%)  Wt(kg): --        03-17-21 @ 07:01  -  03-18-21 @ 07:00  --------------------------------------------------------  IN: 860 mL / OUT: 0 mL / NET: 860 mL      Physical Exam:  	Gen: NAD, well-appearing  	HEENT: PERRL, supple neck, clear oropharynx  	Pulm: CTA B/L  	CV: RRR, S1S2; no rub  	Back: No spinal or CVA tenderness; no sacral edema  	Abd: +BS, soft, nontender/nondistended  	: No suprapubic tenderness  	UE: Warm, FROM, no clubbing, intact strength; no edema; no asterixis  	LE: Warm, FROM, no clubbing, intact strength; no edema  	Neuro: No focal deficits, intact gait  	Psych: Normal affect and mood  	Skin: Warm, without rashes  	Vascular access:    LABS/STUDIES  --------------------------------------------------------------------------------              7.6    5.92  >-----------<  282      [03-18-21 @ 05:19]              28.9     139  |  94  |  14  ----------------------------<  102      [03-18-21 @ 05:19]  3.4   |  35  |  0.76        Ca     9.4     [03-18-21 @ 05:19]      iCa    1.12     [03-17 @ 07:14]      Phos  3.6     [03-18-21 @ 05:19]            Creatinine Trend:  SCr 0.76 [03-18 @ 05:19]  SCr 0.81 [03-17 @ 06:57]  SCr 0.96 [03-16 @ 06:52]  SCr 0.71 [03-15 @ 22:06]  SCr 0.76 [03-15 @ 09:01]    Urinalysis - [03-16-21 @ 06:52]      Color Yellow / Appearance Slightly Turbid / SG 1.020 / pH 6.0      Gluc Negative / Ketone Negative  / Bili Negative / Urobili Negative       Blood Negative / Protein 30 mg/dL / Leuk Est Large / Nitrite Negative      RBC 0 / WBC 11 / Hyaline 13 / Gran  / Sq Epi  / Non Sq Epi 8 / Bacteria Negative      PTH -- (Ca 8.7)      [03-17-21 @ 09:37]   117  Vitamin D (25OH) 9.2      [03-17-21 @ 09:37]  TSH 2.33      [03-15-21 @ 12:07]

## 2021-03-18 NOTE — CHART NOTE - NSCHARTNOTESELECT_GEN_ALL_CORE
s/p RRT/Event Note
Event Note
Letter of Medical Necessity: Non Invasive Ventilator/Event Note
Necessity for home 02/Event Note

## 2021-03-18 NOTE — PROGRESS NOTE ADULT - SUBJECTIVE AND OBJECTIVE BOX
S: Denies SOB but remains on nasal cannula. Denies chest pain or palpitations. Review of systems otherwise (-)    Review of Systems:   Constitutional: [ ] fevers, [ ] chills.   Skin: [ ] dry skin. [ ] rashes.  Psychiatric: [ ] depression, [ ] anxiety.   Gastrointestinal: [ ] BRBPR, [ ] melena.   Neurological: [ ] confusion. [ ] seizures. [ ] shuffling gait.   Ears,Nose,Mouth and Throat: [ ] ear pain [ ] sore throat.   Eyes: [ ] diplopia.   Respiratory: [ ] hemoptysis. [ ] shortness of breath  Cardiovascular: See HPI above  Hematologic/Lymphatic: [ ] anemia. [ ] painful nodes. [ ] prolonged bleeding.   Genitourinary: [ ] hematuria. [ ] flank pain.   Endocrine: [ ] significant change in weight. [ ] intolerance to heat and cold.     Review of systems [x ] otherwise negative, [ ] otherwise unable to obtain    FH: no family history of sudden cardiac death in first degree relatives    SH: [ ] tobacco, [ ] alcohol, [ ] drugs      MEDICATIONS  (STANDING):  heparin   Injectable 5000 Unit(s) SubCutaneous every 12 hours  nystatin Powder 1 Application(s) Topical two times a day    MEDICATIONS  (PRN):      LABS:                          7.6    5.92  )-----------( 282      ( 18 Mar 2021 05:19 )             28.9     Hemoglobin: 7.6 g/dL (03-18 @ 05:19)  Hemoglobin: 7.8 g/dL (03-17 @ 16:16)  Hemoglobin: 7.7 g/dL (03-17 @ 07:00)  Hemoglobin: 7.9 g/dL (03-16 @ 06:53)  Hemoglobin: 8.8 g/dL (03-15 @ 22:06)    03-18    139  |  94<L>  |  14  ----------------------------<  102<H>  3.4<L>   |  35<H>  |  0.76    Ca    9.4      18 Mar 2021 05:19  Phos  3.6     03-18      Creatinine Trend: 0.76<--, 0.81<--, 0.96<--, 0.71<--, 0.76<--             03-17-21 @ 07:01  -  03-18-21 @ 07:00  --------------------------------------------------------  IN: 860 mL / OUT: 0 mL / NET: 860 mL        PHYSICAL EXAM  Vital Signs Last 24 Hrs  T(C): 36.7 (18 Mar 2021 12:12), Max: 36.8 (18 Mar 2021 08:57)  T(F): 98 (18 Mar 2021 12:12), Max: 98.3 (18 Mar 2021 08:57)  HR: 91 (18 Mar 2021 12:12) (86 - 100)  BP: 103/70 (18 Mar 2021 12:12) (103/70 - 123/86)  BP(mean): --  RR: 18 (18 Mar 2021 12:12) (18 - 19)  SpO2: 98% (18 Mar 2021 12:12) (96% - 100%)      General: Well nourished in no acute distress. Alert and Oriented * 3.   Head: Normocephalic and atraumatic.   Neck: No JVD. No bruits. Supple. Does not appear to be enlarged.   Cardiovascular: + S1,S2 ; RRR Soft systolic murmur at the left lower sternal border. No rubs noted.    Lungs: CTA b/l. No rhonchi, rales or wheezes.   Abdomen: + BS, soft. Non tender. Non distended. No rebound. No guarding.   Extremities: No clubbing/cyanosis/edema.   Neurologic: Moves all four extremities. Full range of motion.   Skin: Warm and moist. The patient's skin has normal elasticity and good skin turgor.   Psychiatric: Appropriate mood and affect.  Musculoskeletal: Normal range of motion, normal strength    TELEMETRY: SR 60-90s    ECG: Sinus tach 102 BPM    < from: TTE with Doppler (w/Cont) (03.17.21 @ 07:51) >  Conclusions:  1. Normal mitral valve. Minimal mitral regurgitation.  2. Normal trileaflet aortic valve. No aortic valve  regurgitation seen.  3. Endocardial visualization enhanced with intravenous  injection of Ultrasonic Enhancing Agent (Definity). Normal  left ventricular systolic function. No segmental wall  motion abnormalities.  4. The right ventricle is not well visualized on axis;  normal right ventricular systolic function. The right  ventricle appears mildly enlarged on certain views; may be  due to off-axis imaging.  *** No previous Echo exam.    < end of copied text >      RADIOLOGY:      CTA: (-) PA dilated PA    ASSESSMENT/PLAN: 45y Female admitted with insomnia, SOB, and hypercarbia found with Dilated PA 9-) PE on CTPA    - No pulmonary edema on CTA, no fluid overload on exam  - LE doppler neg for DVT  - Suspect primary pulmonary process  - Pulmonary f/u  - Renal follow up - agree with holding lasix  - TTE with normal LV/RV function, normal pulm pressures  - No further inpatient cardiac w/u planned  - Patient to follow up in our [place] office with Dr. Najera on 3/31 at 10:20 AM ([address], [address], [place] [address] - Office #[phone])    Janak Langley PA-C  Pager: [phone]

## 2021-03-19 LAB
ANION GAP SERPL CALC-SCNC: 12 MMOL/L — SIGNIFICANT CHANGE UP (ref 5–17)
BUN SERPL-MCNC: 10 MG/DL — SIGNIFICANT CHANGE UP (ref 7–23)
CALCIUM SERPL-MCNC: 9.7 MG/DL — SIGNIFICANT CHANGE UP (ref 8.4–10.5)
CHLORIDE SERPL-SCNC: 93 MMOL/L — LOW (ref 96–108)
CO2 SERPL-SCNC: 33 MMOL/L — HIGH (ref 22–31)
CREAT SERPL-MCNC: 0.73 MG/DL — SIGNIFICANT CHANGE UP (ref 0.5–1.3)
FERRITIN SERPL-MCNC: 8 NG/ML — LOW (ref 15–150)
GLUCOSE SERPL-MCNC: 82 MG/DL — SIGNIFICANT CHANGE UP (ref 70–99)
HCT VFR BLD CALC: 28.4 % — LOW (ref 34.5–45)
HGB BLD-MCNC: 7.3 G/DL — LOW (ref 11.5–15.5)
IRON SATN MFR SERPL: 18 UG/DL — LOW (ref 30–160)
IRON SATN MFR SERPL: 5 % — LOW (ref 14–50)
MAGNESIUM SERPL-MCNC: 2.1 MG/DL — SIGNIFICANT CHANGE UP (ref 1.6–2.6)
MCHC RBC-ENTMCNC: 18.5 PG — LOW (ref 27–34)
MCHC RBC-ENTMCNC: 25.7 GM/DL — LOW (ref 32–36)
MCV RBC AUTO: 71.9 FL — LOW (ref 80–100)
NRBC # BLD: 0 /100 WBCS — SIGNIFICANT CHANGE UP (ref 0–0)
PLATELET # BLD AUTO: 259 K/UL — SIGNIFICANT CHANGE UP (ref 150–400)
POTASSIUM SERPL-MCNC: 3.8 MMOL/L — SIGNIFICANT CHANGE UP (ref 3.5–5.3)
POTASSIUM SERPL-SCNC: 3.8 MMOL/L — SIGNIFICANT CHANGE UP (ref 3.5–5.3)
RBC # BLD: 3.95 M/UL — SIGNIFICANT CHANGE UP (ref 3.8–5.2)
RBC # FLD: 23.3 % — HIGH (ref 10.3–14.5)
SODIUM SERPL-SCNC: 138 MMOL/L — SIGNIFICANT CHANGE UP (ref 135–145)
TIBC SERPL-MCNC: 353 UG/DL — SIGNIFICANT CHANGE UP (ref 220–430)
UIBC SERPL-MCNC: 335 UG/DL — SIGNIFICANT CHANGE UP (ref 110–370)
WBC # BLD: 4.67 K/UL — SIGNIFICANT CHANGE UP (ref 3.8–10.5)
WBC # FLD AUTO: 4.67 K/UL — SIGNIFICANT CHANGE UP (ref 3.8–10.5)

## 2021-03-19 PROCEDURE — 99232 SBSQ HOSP IP/OBS MODERATE 35: CPT | Mod: GC

## 2021-03-19 RX ADMIN — HEPARIN SODIUM 5000 UNIT(S): 5000 INJECTION INTRAVENOUS; SUBCUTANEOUS at 06:08

## 2021-03-19 RX ADMIN — HEPARIN SODIUM 5000 UNIT(S): 5000 INJECTION INTRAVENOUS; SUBCUTANEOUS at 17:55

## 2021-03-19 RX ADMIN — NYSTATIN CREAM 1 APPLICATION(S): 100000 CREAM TOPICAL at 17:56

## 2021-03-19 RX ADMIN — NYSTATIN CREAM 1 APPLICATION(S): 100000 CREAM TOPICAL at 06:07

## 2021-03-19 RX ADMIN — IRON SUCROSE 110 MILLIGRAM(S): 20 INJECTION, SOLUTION INTRAVENOUS at 17:54

## 2021-03-19 NOTE — PROGRESS NOTE ADULT - PROBLEM SELECTOR PLAN 1
From hypercarbia related to ARVIND and OHS  Acute hypercarbic resp failure
From hypercarbia related to ARVIND and OHS  Acute hypercarbic resp failure  Probably from morbid obesity BMI >40
Patient with elevated PTH, normal calcium, vitamin D25 deficiency can be contributing to elevated PTH.  Suggest to continue Ergocalciferol 50,000 units weekly and repeat PTH level once vitD is in normal range. Patient to FU as outpatient.  Will continue monitoring and FU.  Discussed plan with patient.
Resolved  From hypercarbia related to ARVIND and OHS  Acute hypercarbic resp failure  Probably from morbid obesity BMI >40
Resolved  From hypercarbia related to ARVIND and OHS  Acute hypercarbic resp failure  Probably from morbid obesity BMI >40

## 2021-03-19 NOTE — PROGRESS NOTE ADULT - PROBLEM SELECTOR PLAN 3
From fibroids  Needs monitoring

## 2021-03-19 NOTE — PROGRESS NOTE ADULT - ASSESSMENT
Assessment  Hyperparathyroidism: 45y Female admitted with SOB and AMS, found to have elevated PTH, Ca WNL, vitamin D-25 hydroxy low, started on ergocalciferol 50,000 units weekly, appears alert and comfortable, DC planning in progress.  SOB: workup in progress, on medications and breathing tx, stable, monitored.      Winston Bright MD  Cell: [phone]  Office: [phone]     Assessment  Hyperparathyroidism: 45y Female admitted with SOB and AMS, found to have elevated PTH, Ca WNL, vitamin D-25 hydroxy low, started on ergocalciferol 50,000 units weekly,  appears alert and comfortable, DC planning in progress.  SOB: workup in progress, on medications and breathing tx, stable, monitored.      Winston Bright MD  Cell: [phone]  Office: [phone]

## 2021-03-19 NOTE — PROGRESS NOTE ADULT - SUBJECTIVE AND OBJECTIVE BOX
Date of Service: 03-19-21 @ 17:16    Patient is a 45y old  Female who presents with a chief complaint of SOB (18 Mar 2021 14:04)      Any change in ROS: Sheis doing very good: alert and awake    MEDICATIONS  (STANDING):  ergocalciferol 67628 Unit(s) Oral <User Schedule>  heparin   Injectable 5000 Unit(s) SubCutaneous every 12 hours  iron sucrose IVPB 200 milliGRAM(s) IV Intermittent every 24 hours  nystatin Powder 1 Application(s) Topical two times a day    MEDICATIONS  (PRN):    Vital Signs Last 24 Hrs  T(C): 36.6 (19 Mar 2021 12:48), Max: 36.9 (18 Mar 2021 23:46)  T(F): 97.9 (19 Mar 2021 12:48), Max: 98.4 (18 Mar 2021 23:46)  HR: 93 (19 Mar 2021 12:48) (82 - 95)  BP: 107/72 (19 Mar 2021 12:48) (107/72 - 117/86)  BP(mean): --  RR: 18 (19 Mar 2021 12:48) (18 - 18)  SpO2: 100% (19 Mar 2021 12:48) (96% - 100%)    I&O's Summary    18 Mar 2021 07:01  -  19 Mar 2021 07:00  --------------------------------------------------------  IN: 660 mL / OUT: 0 mL / NET: 660 mL    19 Mar 2021 07:01  -  19 Mar 2021 17:16  --------------------------------------------------------  IN: 480 mL / OUT: 0 mL / NET: 480 mL          Physical Exam:   GENERAL: Obese+  HEENT: MANSOOR/   Atraumatic, Normocephalic  ENMT: No tonsillar erythema, exudates, or enlargement; Moist mucous membranes, Good dentition, No lesions  NECK: Supple, No JVD, Normal thyroid  CHEST/LUNG: Clear to auscultaionrubs  CVS: Regular rate and rhythm; No murmurs, rubs, or gallops  GI: : Soft, Nontender, Nondistended; Bowel sounds present  NERVOUS SYSTEM:  Alert & Oriented X3  EXTREMITIES:  2+ Peripheral Pulses, No clubbing, cyanosis, or edema  LYMPH: No lymphadenopathy noted  SKIN: No rashes or lesions  ENDOCRINOLOGY: No Thyromegaly  PSYCH: Appropriate    Labs:  36, 38, 38                            7.3    4.67  )-----------( 259      ( 19 Mar 2021 07:06 )             28.4                         7.6    5.92  )-----------( 282      ( 18 Mar 2021 05:19 )             28.9                         7.8    7.59  )-----------( 321      ( 17 Mar 2021 16:16 )             29.9                         7.7    6.68  )-----------( 363      ( 17 Mar 2021 07:00 )             28.7                         7.9    8.24  )-----------( 423      ( 16 Mar 2021 06:53 )             30.8                         8.8    9.46  )-----------( 509      ( 15 Mar 2021 22:06 )             34.9     03-19    138  |  93<L>  |  10  ----------------------------<  82  3.8   |  33<H>  |  0.73  03-18    139  |  94<L>  |  14  ----------------------------<  102<H>  3.4<L>   |  35<H>  |  0.76  03-17    141  |  94<L>  |  20  ----------------------------<  70  3.7   |  33<H>  |  0.81  03-16    139  |  91<L>  |  14  ----------------------------<  70  4.8   |  34<H>  |  0.96  03-15    141  |  94<L>  |  10  ----------------------------<  119<H>  4.4   |  37<H>  |  0.71    Ca    9.7      19 Mar 2021 07:03  Ca    9.4      18 Mar 2021 05:19  Phos  3.6     03-18  Mg     2.1     03-19    TPro  6.5  /  Alb  3.4  /  TBili  0.6  /  DBili  x   /  AST  24  /  ALT  21  /  AlkPhos  56  03-16  TPro  7.5  /  Alb  4.1  /  TBili  0.4  /  DBili  x   /  AST  25  /  ALT  25  /  AlkPhos  64  03-15    CAPILLARY BLOOD GLUCOSE            r< from: [facility] Duplex Lower Ext Vein Scan, Bilat (03.16.21 @ 12:47) >    03/16/2021            INTERPRETATION:  CLINICAL INFORMATION: Short of breath, lower extremity paresthesias, rule out DVT.    COMPARISON: A prior examination of the RIGHT lower extremity, dated 6/16/2013, showed right soleal vein DVT.    TECHNIQUE: Duplex sonography of the BILATERAL LOWER extremity veins with color and spectral Doppler, with and without compression.    FINDINGS:    RIGHT:  Normal compressibility ofthe RIGHT common femoral, femoral and popliteal veins.  Doppler examination shows normal spontaneous and phasic flow.  No RIGHT calf vein thrombosis is detected.    LEFT:  Normal compressibility of the LEFT common femoral, femoral and popliteal veins.  Doppler examination shows normal spontaneous and phasic flow.  No LEFT calf vein thrombosis is detected.    IMPRESSION:  No evidence of deep venous thrombosis in either lower extremity.      < end of copied text >  < from: CT Head No Cont (03.15.21 @ 10:50) >        INTERPRETATION:  CT OF THE HEAD WITHOUT CONTRAST    CLINICAL INDICATION: Altered mental status, slurred speech.    TECHNIQUE: Volumetric CT acquisition was performed through the brain and reviewed using brain and bone window technique. Sagittal and coronal reconstructed images were obtained. Dose optimization techniques were utilized including kVp/mA modulation along with iterative reconstructions.    Radiation dose estimate:  The DLP was 809.91 mGy-cm    COMPARISON: No prior studies have been submitted for comparison.    FINDINGS:    The ventricular and sulcal size and configuration is age appropriate.   There is no acute loss of gray-white differentiation.    There is no evidence of mass effect, midline shift, acute intracranial hemorrhage, or extra-axial collections.    The visualized paranasal sinuses and temporal bones are adequately developed and aerated.      IMPRESSION:    No evidence of acute infarct, intracranial hemorrhage or mass effect.    < end of copied text >            RECENT CULTURES:        RESPIRATORY CULTURES:          Studies  Chest X-RAY  CT SCAN Chest   Venous Dopplers: LE:   CT Abdomen  Others      eh< from: TTE with Doppler (w/Cont) (03.17.21 @ 07:51) >  Estimated right ventricular systolic pressure equals 27 mm  Hg, assuming right atrial pressure equals 8 mm Hg,  consistent with normal pulmonary pressures.  ------------------------------------------------------------------------  Conclusions:  1. Normal mitral valve. Minimal mitral regurgitation.  2. Normal trileaflet aortic valve. No aortic valve  regurgitation seen.  3. Endocardial visualization enhanced with intravenous  injection of Ultrasonic Enhancing Agent (Definity). Normal  left ventricular systolic function. No segmental wall  motion abnormalities.  4. The right ventricle is not well visualized on axis;  normal right ventricular systolic function. The right  ventricle appears mildly enlarged on certain views; may be  due to off-axis imaging.  *** No previous Echo exam.  ------------------------------------------------------------------------  Confirmed on  3/17/2021 - 10:00:22 by Beth Bhatt M.D.  ------------------------------------------------------------------------    < end of copied text >

## 2021-03-19 NOTE — PROGRESS NOTE ADULT - ASSESSMENT
44 y/o female with PMHx fibroids s/p myomectomy x2 PSx , hx of PE, of pre-eclampsia works from home now presenting to the ED with SOB & intermittent confusion x2 weeks and anxiety causing insomnia x2 weeks. Pt was suspected to have OHS/ ARVIND, started on AVAPS & given IV lasix x 2 doses. Nephrology was called due to pt's acid base disturbance. Pt denies any hx of kidney dz in self & family. Takes valerian root extract for insomnia. No blood/pus in urine or frothy urine. No OTC med or recent abx used. No smoke/ etoh/ drugs. Does not take any regular meds. Denies any exogenous alkali intake       Acute on chronic hypercapnic respiratory failure likely from OHS/ ARVIND-   now with overcompensation from AVAPS & metabolic (contraction) alkalosis due to lasix x 2 doses  ABG on admission showed Respiratory acidosis without adequate metabolic compensation  ABG on 3/16/21:    7.42/ consistent with acute on chronic respiratory acidosis (Primary disorder) likely from OHS/ ARVIND + metabolic alkalosis liekly from overcompensation due to AVAPS & addition of lasix (expected PCO2 is 39)  CT Chest did not show PE, but did show dilated pulm artery, likely PHTN from OHS/ ARVIND    1. Acute on chronic Resp acidosis with met compensation-   now with overcompensation from AVAPS & metabolic (contraction) alkalosis due to lasix x 2 doses  ABG on admission showed Respiratory acidosis without adequate metabolic compensation  ABG on 3/16/21: 7.42//46 consistent with acute on chronic respiratory acidosis (Primary disorder) likely from OHS/ ARVIND + metabolic alkalosis liekly from compensation due to AVAPS & addition of lasix (expected PCO2 is 39)  CT Chest did not show PE, but did show dilated pulm artery, likely PHTN from OHS/ ARVIND  Pt appears euvolemic on exam  c/w AVAPS at night. Avoid overcompensation  Sleep study as outpatient  Weight loss would be beneficial   No signs of secondary hyperaldo state but check renin/diego level- and mineralcorticoid excess such as Cushing's possible  Less likely milk alkali syndrome as normocalcemic   Plasma renal activity pending, renin plasma levels are not accurate usually, pending diego level  Cortisol normal, less likely Cushing's  Pt appears euvolemic on exam, no diuresis      2. Elevated PTH- this is likely in setting of severe vitamin D def, can replete ergocalciferol 50,000 units once a week for 8 weeks.   Normo-calcemic primary PTH disease can happen but would see the trend first post treatment. Renal function is normal so less likely secondary PTH and 1,25 vit is normal  Rest per Endo eval       Will sign off. Please call us prn

## 2021-03-19 NOTE — PROGRESS NOTE ADULT - SUBJECTIVE AND OBJECTIVE BOX
[facility] DIVISION OF KIDNEY DISEASES AND HYPERTENSION -- FOLLOW UP NOTE  --------------------------------------------------------------------------------  Chief Complaint:    24 hour events/subjective:        PAST HISTORY  --------------------------------------------------------------------------------  No significant changes to PMH, PSH, FHx, SHx, unless otherwise noted    ALLERGIES & MEDICATIONS  --------------------------------------------------------------------------------  Allergies    No Known Allergies    Intolerances      Standing Inpatient Medications  ergocalciferol 45873 Unit(s) Oral <User Schedule>  heparin   Injectable 5000 Unit(s) SubCutaneous every 12 hours  iron sucrose IVPB 200 milliGRAM(s) IV Intermittent every 24 hours  nystatin Powder 1 Application(s) Topical two times a day    PRN Inpatient Medications      REVIEW OF SYSTEMS  --------------------------------------------------------------------------------  Gen: No weight changes, fatigue, fevers/chills, weakness  Skin: No rashes  Head/Eyes/Ears/Mouth: No headache; Normal hearing; Normal vision w/o blurriness; No sinus pain/discomfort, sore throat  Respiratory: No dyspnea, cough, wheezing, hemoptysis  CV: No chest pain, PND, orthopnea  GI: No abdominal pain, diarrhea, constipation, nausea, vomiting, melena, hematochezia  : No increased frequency, dysuria, hematuria, nocturia  MSK: No joint pain/swelling; no back pain; no edema  Neuro: No dizziness/lightheadedness, weakness, seizures, numbness, tingling  Heme: No easy bruising or bleeding  Endo: No heat/cold intolerance  Psych: No significant nervousness, anxiety, stress, depression    All other systems were reviewed and are negative, except as noted.    VITALS/PHYSICAL EXAM  --------------------------------------------------------------------------------  T(C): 36.6 (03-19-21 @ 12:48), Max: 36.9 (03-18-21 @ 23:46)  HR: 93 (03-19-21 @ 12:48) (82 - 95)  BP: 107/72 (03-19-21 @ 12:48) (107/72 - 117/86)  RR: 18 (03-19-21 @ 12:48) (18 - 18)  SpO2: 100% (03-19-21 @ 12:48) (96% - 100%)  Wt(kg): --        03-18-21 @ 07:01  -  03-19-21 @ 07:00  --------------------------------------------------------  IN: 660 mL / OUT: 0 mL / NET: 660 mL    03-19-21 @ 07:01  -  03-19-21 @ 17:30  --------------------------------------------------------  IN: 480 mL / OUT: 0 mL / NET: 480 mL      PHYSICAL EXAM: vital signs as above  in no apparent distress  Neck: Supple, no JVD,    Lungs: no rhonchi, no wheeze, no crackles  CVS: S1 S2 no M/R/G  Abdomen: no tenderness, no organomegaly, BS present  Neuro: Grossly intact  Skin: warm, dry  Ext: no cyanosis or clubbing, no edema    LABS/STUDIES  --------------------------------------------------------------------------------              7.3    4.67  >-----------<  259      [03-19-21 @ 07:06]              28.4     138  |  93  |  10  ----------------------------<  82      [03-19-21 @ 07:03]  3.8   |  33  |  0.73        Ca     9.7     [03-19-21 @ 07:03]      Mg     2.1     [03-19-21 @ 07:03]      Phos  3.6     [03-18-21 @ 05:19]            Creatinine Trend:  SCr 0.73 [03-19 @ 07:03]  SCr 0.76 [03-18 @ 05:19]  SCr 0.81 [03-17 @ 06:57]  SCr 0.96 [03-16 @ 06:52]  SCr 0.71 [03-15 @ 22:06]    Urinalysis - [03-16-21 @ 06:52]      Color Yellow / Appearance Slightly Turbid / SG 1.020 / pH 6.0      Gluc Negative / Ketone Negative  / Bili Negative / Urobili Negative       Blood Negative / Protein 30 mg/dL / Leuk Est Large / Nitrite Negative      RBC 0 / WBC 11 / Hyaline 13 / Gran  / Sq Epi  / Non Sq Epi 8 / Bacteria Negative      Iron 18, TIBC 353, %sat 5      [03-17-21 @ 13:11]  Ferritin 8      [03-17-21 @ 13:11]  PTH -- (Ca 8.7)      [03-17-21 @ 09:37]   117  Vitamin D (25OH) 9.2      [03-17-21 @ 09:37]  TSH 2.33      [03-15-21 @ 12:07]

## 2021-03-19 NOTE — PROGRESS NOTE ADULT - SUBJECTIVE AND OBJECTIVE BOX
Chief complaint  Patient is a 45y old  Female who presents with a chief complaint of SOB (18 Mar 2021 14:04)   Review of systems  Patient in bed, looks comfortable.      Medications  MEDICATIONS  (STANDING):  ergocalciferol 65761 Unit(s) Oral <User Schedule>  heparin   Injectable 5000 Unit(s) SubCutaneous every 12 hours  iron sucrose IVPB 200 milliGRAM(s) IV Intermittent every 24 hours  nystatin Powder 1 Application(s) Topical two times a day      Physical Exam  General: Patient comfortable in bed  Vital Signs Last 12 Hrs  T(F): 97.9 (03-19-21 @ 12:48), Max: 97.9 (03-19-21 @ 12:48)  HR: 93 (03-19-21 @ 12:48) (82 - 93)  BP: 107/72 (03-19-21 @ 12:48) (107/72 - 117/86)  BP(mean): --  RR: 18 (03-19-21 @ 12:48) (18 - 18)  SpO2: 100% (03-19-21 @ 12:48) (96% - 100%)           Chief complaint  Patient is a 45y old  Female who presents with a chief complaint of SOB (18 Mar 2021 14:04)   Review of systems  Patient in bed, looks comfortable.      Medications  MEDICATIONS  (STANDING):  ergocalciferol 50535 Unit(s) Oral <User Schedule>  heparin   Injectable 5000 Unit(s) SubCutaneous every 12 hours  iron sucrose IVPB 200 milliGRAM(s) IV Intermittent every 24 hours  nystatin Powder 1 Application(s) Topical two times a day      Physical Exam  General: Patient comfortable in bed  Vital Signs Last 12 Hrs  T(F): 97.9 (03-19-21 @ 12:48), Max: 97.9 (03-19-21 @ 12:48)  HR: 93 (03-19-21 @ 12:48) (82 - 93)  BP: 107/72 (03-19-21 @ 12:48) (107/72 - 117/86)  BP(mean): --  RR: 18 (03-19-21 @ 12:48) (18 - 18)  SpO2: 100% (03-19-21 @ 12:48) (96% - 100%)

## 2021-03-19 NOTE — PROGRESS NOTE ADULT - SUBJECTIVE AND OBJECTIVE BOX
S: SOB improved. Denies chest pain or palpitations. Review of systems otherwise (-)    Review of Systems:   Constitutional: [ ] fevers, [ ] chills.   Skin: [ ] dry skin. [ ] rashes.  Psychiatric: [ ] depression, [ ] anxiety.   Gastrointestinal: [ ] BRBPR, [ ] melena.   Neurological: [ ] confusion. [ ] seizures. [ ] shuffling gait.   Ears,Nose,Mouth and Throat: [ ] ear pain [ ] sore throat.   Eyes: [ ] diplopia.   Respiratory: [ ] hemoptysis. [ ] shortness of breath  Cardiovascular: See HPI above  Hematologic/Lymphatic: [ ] anemia. [ ] painful nodes. [ ] prolonged bleeding.   Genitourinary: [ ] hematuria. [ ] flank pain.   Endocrine: [ ] significant change in weight. [ ] intolerance to heat and cold.     Review of systems [x ] otherwise negative, [ ] otherwise unable to obtain    FH: no family history of sudden cardiac death in first degree relatives    SH: [ ] tobacco, [ ] alcohol, [ ] drugs      MEDICATIONS  (STANDING):  ergocalciferol 70789 Unit(s) Oral <User Schedule>  heparin   Injectable 5000 Unit(s) SubCutaneous every 12 hours  iron sucrose IVPB 200 milliGRAM(s) IV Intermittent every 24 hours  nystatin Powder 1 Application(s) Topical two times a day    MEDICATIONS  (PRN):      LABS:                          7.3    4.67  )-----------( 259      ( 19 Mar 2021 07:06 )             28.4     Hemoglobin: 7.3 g/dL (03-19 @ 07:06)  Hemoglobin: 7.6 g/dL (03-18 @ 05:19)  Hemoglobin: 7.8 g/dL (03-17 @ 16:16)  Hemoglobin: 7.7 g/dL (03-17 @ 07:00)  Hemoglobin: 7.9 g/dL (03-16 @ 06:53)    03-19    138  |  93<L>  |  10  ----------------------------<  82  3.8   |  33<H>  |  0.73    Ca    9.7      19 Mar 2021 07:03  Phos  3.6     03-18  Mg     2.1     03-19      Creatinine Trend: 0.73<--, 0.76<--, 0.81<--, 0.96<--, 0.71<--, 0.76<--       03-18-21 @ 07:01  -  03-19-21 @ 07:00  --------------------------------------------------------  IN: 660 mL / OUT: 0 mL / NET: 660 mL      PHYSICAL EXAM  Vital Signs Last 24 Hrs  T(C): 36.6 (19 Mar 2021 12:48), Max: 36.9 (18 Mar 2021 23:46)  T(F): 97.9 (19 Mar 2021 12:48), Max: 98.4 (18 Mar 2021 23:46)  HR: 93 (19 Mar 2021 12:48) (82 - 95)  BP: 107/72 (19 Mar 2021 12:48) (107/72 - 117/86)  BP(mean): --  RR: 18 (19 Mar 2021 12:48) (18 - 18)  SpO2: 100% (19 Mar 2021 12:48) (96% - 100%)      General: Well nourished in no acute distress. Alert and Oriented * 3.   Head: Normocephalic and atraumatic.   Neck: No JVD. No bruits. Supple. Does not appear to be enlarged.   Cardiovascular: + S1,S2 ; RRR Soft systolic murmur at the left lower sternal border. No rubs noted.    Lungs: CTA b/l. No rhonchi, rales or wheezes.   Abdomen: + BS, soft. Non tender. Non distended. No rebound. No guarding.   Extremities: No clubbing/cyanosis/edema.   Neurologic: Moves all four extremities. Full range of motion.   Skin: Warm and moist. The patient's skin has normal elasticity and good skin turgor.   Psychiatric: Appropriate mood and affect.  Musculoskeletal: Normal range of motion, normal strength    TELEMETRY: SR/ST 80-100s    ECG: Sinus tach 102 BPM    < from: TTE with Doppler (w/Cont) (03.17.21 @ 07:51) >  Conclusions:  1. Normal mitral valve. Minimal mitral regurgitation.  2. Normal trileaflet aortic valve. No aortic valve  regurgitation seen.  3. Endocardial visualization enhanced with intravenous  injection of Ultrasonic Enhancing Agent (Definity). Normal  left ventricular systolic function. No segmental wall  motion abnormalities.  4. The right ventricle is not well visualized on axis;  normal right ventricular systolic function. The right  ventricle appears mildly enlarged on certain views; may be  due to off-axis imaging.  *** No previous Echo exam.    < end of copied text >      RADIOLOGY:      CTA: (-) PA dilated PA    ASSESSMENT/PLAN: 45y Female admitted with insomnia, SOB, and hypercarbia found with Dilated PA 9-) PE on CTPA    - Tele stable in sinus  - No pulmonary edema on CTA, no fluid overload on exam  - LE doppler neg for DVT  - Suspect primary pulmonary process  - Pulmonary f/u  - Renal follow up - agree with holding lasix  - TTE with normal LV/RV function, normal pulm pressures  - No further inpatient cardiac w/u planned  - Patient to follow up in our [facility] office with Dr. Najera on 3/31 at 10:20 AM ([address], [address], [place] [address] - Office #[phone])    Janak Langley PA-C  Pager: [phone]

## 2021-03-19 NOTE — PROGRESS NOTE ADULT - ASSESSMENT
46 y/o female with PMHx fibroids s/p myomectomy x2 PSx  works from home now presenting to the ED with SOB x2 weeks and anxiety causing insomnia x2 weeks. Patient reported she cannot sleep more than 1-2 hours per night and is now falling asleep mid sentence slurring her words. Patient tried to take valerian root with no improvement of sleep. Patient has recent stressor of moving. Patient also feels like she cannot take a deep breath and catch her breath. Strong family history of lung cancer. Patient denied CP, h/o COVID, abdominal pain, N/V/D, weight loss, rash, fever chills, cough, back pain, dizziness, syncope, palpitations, h/o insomnia  CT Chest did not show PE, but did show dilated pulm artery (15 Mar 2021 12:37)  she is obese and says she has not been sleeping for months: She did try taking over the counter meds for sleeping but to no help: Many years ago > 5 years ago she was in weight loss program and remembers taking Lasix at that time: She did have pe and few years ago and was on Xarelto for 6 months and she says 5that was after the surgery : She had myomectomy before:  She is not taking any OCP as well as weight reduction pills at this time:  She has no underlying medical problems  She denies smoking as well as drugs:   She has one child and her  and she recently moved to her mothers house:   she also tells me that many times she was incoherent where people were not able to understand her:  She denies any other medical problem s  called her mother to get more history: she said that she is very weak and she can not sleep and she has insomnia but during the day she is very sleepy: When she sleep her mouth is always open: She snores loudly: the mother cant tell if she has apneas:   for tow or three years she was sleeping with her in [place] and she was prompted to go to  as she was snoring a lot:   She has HTN while she was pregnant: She is not taking any med for it now:   she is not sure whether she got sob on walking:        ARVIND/ OHS: NO ASTHMA: SHE NEVER SMOKED: Morbid Obesity: Acute on Chr hypercarbic resp failure : She  likely has OHS ( her admission hco3 is 31) and ARVIND: as she is very sleepy and  tired in the daytime and does not sleep well: Her cta is negative for pe though limited and her lung parenchyma looks OK: except some atelectasis at bases: Her PA are enlaced and she also likely has pulmonary hypertension she would need echo to rule out other cardiac diseases as well as pulm htn measurement: She is already  on Lasix. She would need bipap for now and then gradually change to prn in daytime and full time at night time: would increase bipap to 15 and 5  : ABG about 5-6 PM and one in morning: Avoid any narcotics and sedatives as well as benzos   GESTATIONAL HYPERTENSION: Normotensive at this time  LIKELY PULMONARY HYPERTENSION: needs echo  hx of PE: She had pe many years ago after the surgery : CTA at this time is negative though limited: but would also do dopplers:   DVT propahyxlis    3/16:    ARVIND/ OHS: NO ASTHMA: SHE NEVER SMOKED: Morbid Obesity: She was switched to avaps last night by MICU : currently sheis doign pretty good: Todasy ABG with overventilation: has met as wellas resp alkalosis baseline co2 somewhere arround high 50 to low 60:   GESTATIONAL HYPERTENSION: Normotensive at this time  LIKELY PULMONARY HYPERTENSION: needs echo  hx of PE: She had pe many years ago after the surgery : CTA at this time is negative though limited: but would also do dopplers- PENDING  DVT propahyxlis  dw np    3/17:      ARVIND/ OHS: NO ASTHMA: SHE NEVER SMOKED: Morbid Obesity: on avaps: abg pretty good: has chr retention of paco2 has OHS and would be helped if she gets NIV at home: she would needoutpt PSG: her wcho is noted: too: RV is OK: She may also need home o2:   GESTATIONAL HYPERTENSION: Normotensive at this time  LIKELY PULMONARY HYPERTENSION: needs echo  hx of PE: She had pe many years ago after the surgery : CTA at this time is negative though limited: but would also do dopplers- PENDING  DVT propahyxlis  dw np    3/18:    ARVIND/ OHS: NO ASTHMA: SHE NEVER SMOKED: Morbid Obesity: ac on chr hypercapnic resp fialure on avaps: abg pretty good: has chr retention of paco2 has OHS and would be helped if she gets NIV at home: she would need outpt PSG: her echo is noted: too: RV is OK: She may also need home o2:   GESTATIONAL HYPERTENSION: Normotensive at this time  LIKELY PULMONARY HYPERTENSION: needs echo  hx of PE: She had pe many years ago after the surgery : CTA at this time is negative though limited: but would also do dopplers- negative  dw np    3/19:  ARVIND/ OHS: NO ASTHMA: SHE NEVER SMOKED: Morbid Obesity: ac on chr hypercapnic resp fialure on avaps: abg pretty good: has chr retention of paco2 has OHS and would be helped if she gets NIV at home: she would need outpt PSG: her echo is noted: too: RV is OK: She may also need home o2: sheis doing pretty good so far  GESTATIONAL HYPERTENSION: Normotensive at this time  LIKELY PULMONARY HYPERTENSION:  echo: ormal  hx of PE: She had pe many years ago after the surgery : CTA at this time is negative though limited: but would also do dopplers- negative  dw pt

## 2021-03-19 NOTE — PROGRESS NOTE ADULT - SUBJECTIVE AND OBJECTIVE BOX
Health system DIVISION OF KIDNEY DISEASES AND HYPERTENSION -- FOLLOW UP NOTE  --------------------------------------------------------------------------------  Chief Complaint:    24 hour events/subjective:  Labs improving. Pt feels a lot better.       PAST HISTORY  --------------------------------------------------------------------------------  No significant changes to PMH, PSH, FHx, SHx, unless otherwise noted    ALLERGIES & MEDICATIONS  --------------------------------------------------------------------------------  Allergies    No Known Allergies    Intolerances      Standing Inpatient Medications  heparin   Injectable 5000 Unit(s) SubCutaneous every 12 hours  nystatin Powder 1 Application(s) Topical two times a day    PRN Inpatient Medications      REVIEW OF SYSTEMS  --------------------------------------------------------------------------------  Gen: No weight changes, fatigue, fevers/chills, weakness  Skin: No rashes  Head/Eyes/Ears/Mouth: No headache; Normal hearing; Normal vision w/o blurriness; No sinus pain/discomfort, sore throat  Respiratory: No dyspnea, cough, wheezing, hemoptysis  CV: No chest pain, PND, orthopnea  GI: No abdominal pain, diarrhea, constipation, nausea, vomiting, melena, hematochezia  : No increased frequency, dysuria, hematuria, nocturia  MSK: No joint pain/swelling; no back pain; no edema  Neuro: No dizziness/lightheadedness, weakness, seizures, numbness, tingling  Heme: No easy bruising or bleeding  Endo: No heat/cold intolerance  Psych: No significant nervousness, anxiety, stress, depression    All other systems were reviewed and are negative, except as noted.    VITALS/PHYSICAL EXAM  --------------------------------------------------------------------------------  T(C): 36.7 (03-18-21 @ 12:12), Max: 36.8 (03-18-21 @ 08:57)  HR: 91 (03-18-21 @ 12:12) (86 - 100)  BP: 103/70 (03-18-21 @ 12:12) (103/70 - 123/86)  RR: 18 (03-18-21 @ 12:12) (18 - 19)  SpO2: 98% (03-18-21 @ 12:12) (96% - 100%)  Wt(kg): --        03-17-21 @ 07:01  -  03-18-21 @ 07:00  --------------------------------------------------------  IN: 860 mL / OUT: 0 mL / NET: 860 mL      Physical Exam:  	Gen: NAD, well-appearing  	HEENT: PERRL, supple neck, clear oropharynx  	Pulm: CTA B/L  	CV: RRR, S1S2; no rub  	Back: No spinal or CVA tenderness; no sacral edema  	Abd: +BS, soft, nontender/nondistended  	: No suprapubic tenderness  	UE: Warm, FROM, no clubbing, intact strength; no edema; no asterixis  	LE: Warm, FROM, no clubbing, intact strength; no edema  	Neuro: No focal deficits, intact gait  	Psych: Normal affect and mood  	Skin: Warm, without rashes  	Vascular access:    LABS/STUDIES  --------------------------------------------------------------------------------              7.6    5.92  >-----------<  282      [03-18-21 @ 05:19]              28.9     139  |  94  |  14  ----------------------------<  102      [03-18-21 @ 05:19]  3.4   |  35  |  0.76        Ca     9.4     [03-18-21 @ 05:19]      iCa    1.12     [03-17 @ 07:14]      Phos  3.6     [03-18-21 @ 05:19]            Creatinine Trend:  SCr 0.76 [03-18 @ 05:19]  SCr 0.81 [03-17 @ 06:57]  SCr 0.96 [03-16 @ 06:52]  SCr 0.71 [03-15 @ 22:06]  SCr 0.76 [03-15 @ 09:01]    Urinalysis - [03-16-21 @ 06:52]      Color Yellow / Appearance Slightly Turbid / SG 1.020 / pH 6.0      Gluc Negative / Ketone Negative  / Bili Negative / Urobili Negative       Blood Negative / Protein 30 mg/dL / Leuk Est Large / Nitrite Negative      RBC 0 / WBC 11 / Hyaline 13 / Gran  / Sq Epi  / Non Sq Epi 8 / Bacteria Negative      PTH -- (Ca 8.7)      [03-17-21 @ 09:37]   117  Vitamin D (25OH) 9.2      [03-17-21 @ 09:37]  TSH 2.33      [03-15-21 @ 12:07]

## 2021-03-19 NOTE — PROGRESS NOTE ADULT - ASSESSMENT
44 y/o female with PMHx fibroids s/p myomectomy x2 PSx , hx of PE, of pre-eclampsia works from home now presenting to the ED with SOB & intermittent confusion x2 weeks and anxiety causing insomnia x2 weeks. Pt was suspected to have OHS/ ARVIND, started on AVAPS & given IV lasix x 2 doses. Nephrology was called due to pt's acid base disturbance. Pt denies any hx of kidney dz in self & family. Takes valerian root extract for insomnia. No blood/pus in urine or frothy urine. No OTC med or recent abx used. No smoke/ etoh/ drugs. Does not take any regular meds. Denies any exogenous alkali intake       Acute on chronic hypercapnic respiratory failure likely from OHS/ ARVIND-   now with overcompensation from AVAPS & metabolic (contraction) alkalosis due to lasix x 2 doses  ABG on admission showed Respiratory acidosis without adequate metabolic compensation  ABG on 3/16/21:    7.42/68/46 consistent with acute on chronic respiratory acidosis (Primary disorder) likely from OHS/ ARVIND + metabolic alkalosis liekly from overcompensation due to AVAPS & addition of lasix (expected PCO2 is 39)  CT Chest did not show PE, but did show dilated pulm artery, likely PHTN from OHS/ ARVIND    1. Resp acidosis with chronic and acute met alk- chronic is compensatory from the resp acidosis. Given low chloride state and drop of pco2 from 90 to 45, likely post hypercapnic met alk vs diuretic related contraction now.   No signs of secondary hyperaldo state but check renin/diego level- and mineralcorticoid excess such as Cushing's possible  Less likely milk alkali syndrome as normocalcemic   Plasma renal activity pending, renin plasma levels are not accurate usually, pending diego level  Cortisol normal, less likely Cushing's  Pt appears euvolemic on exam, no diuresis    2. Elevated PTH- this is likely in setting of severe vitamin D def, can replete ergocalciferol 50,000 units once a week for 8 weeks.   Normo-calcemic primary PTH disease can happen but would see the trend first post treatment. Renal function is normal so less likely secondary PTH and 1,25 vit is normal. Appreciate endo eval    Please have patient f.u with Nephrology office at  with one of the physicians in 1-2 weeks  Our address is [address] [place] [address]   (Kirstie, Adebayo, Finger, Delilah, Shanel, Valerie, Wojciech, Cielo, Gume, Lpue, Max, Royce, Abhinav, Heavenly, Rosalia, Daphney, or Dione)    Sherwin Dunaway MD  Cell   Pager   Office     For weekend please contact Dr Lambert Bañuelos( fellow) or Dr Bayron Pereira( attending)

## 2021-03-19 NOTE — PROGRESS NOTE ADULT - SUBJECTIVE AND OBJECTIVE BOX
Patient is a 45y old  Female who presents with a chief complaint of Hypercapnic Respiratory Failure (16 Mar 2021 00:00)      HPI:  DATE OF SERVICE: 21 @ 12:00  Awake and alert    Feeling much better      PAST MEDICAL & SURGICAL HISTORY:  Fibroid    H/O:     H/O myomectomy        Review of Systems:   CONSTITUTIONAL: No fever, weight loss, or fatigue  EYES: No eye pain, visual disturbances, or discharge  ENMT:  No difficulty hearing, tinnitus, vertigo; No sinus or throat pain  NECK: No pain or stiffness  BREASTS: No pain, masses, or nipple discharge  RESPIRATORY: No cough, wheezing, chills or hemoptysis; +shortness of breath  CARDIOVASCULAR: No chest pain, palpitations, dizziness, or leg swelling  GASTROINTESTINAL: No abdominal or epigastric pain. No nausea, vomiting, or hematemesis; No diarrhea or constipation. No melena or hematochezia.  GENITOURINARY: No dysuria, frequency, hematuria, or incontinence  NEUROLOGICAL: No headaches, memory loss, loss of strength, numbness, or tremors  SKIN: No itching, burning, rashes, or lesions   LYMPH NODES: No enlarged glands  ENDOCRINE: No heat or cold intolerance; No hair loss  MUSCULOSKELETAL: No joint pain or swelling; No muscle, back, or extremity pain  PSYCHIATRIC: No depression, anxiety, mood swings, or difficulty sleeping  HEME/LYMPH: No easy bruising, or bleeding gums  ALLERY AND IMMUNOLOGIC: No hives or eczema    Allergies    No Known Allergies    Intolerances        Social History:     FAMILY HISTORY:      MEDICATIONS  (STANDING):  furosemide   Injectable 40 milliGRAM(s) IV Push daily  heparin   Injectable 5000 Unit(s) SubCutaneous every 12 hours    MEDICATIONS  (PRN):        CAPILLARY BLOOD GLUCOSE      POCT Blood Glucose.: 120 mg/dL (15 Mar 2021 21:44)    I&O's Summary    15 Mar 2021 07:  -  16 Mar 2021 07:00  --------------------------------------------------------  IN: 0 mL / OUT: 600 mL / NET: -600 mL    16 Mar 2021 07:01  -  16 Mar 2021 16:25  --------------------------------------------------------  IN: 120 mL / OUT: 300 mL / NET: -180 mL        PHYSICAL EXAM:  Vital Signs Last 24 Hrs  T(C): 37.4 (16 Mar 2021 11:54), Max: 37.6 (15 Mar 2021 22:53)  T(F): 99.4 (16 Mar 2021 11:54), Max: 99.6 (15 Mar 2021 22:53)  HR: 91 (16 Mar 2021 15:30) (89 - 111)  BP: 107/55 (16 Mar 2021 11:54) (103/66 - 126/77)  BP(mean): --  RR: 18 (16 Mar 2021 15:30) (18 - 20)  SpO2: 94% (16 Mar 2021 15:30) (92% - 100%)    GENERAL: NAD, well-developed  HEAD:  Atraumatic, Normocephalic  EYES: EOMI, PERRLA, conjunctiva and sclera clear  NECK: Supple, No JVD  CHEST/LUNG: Clear to auscultation bilaterally; No wheeze  HEART: Regular rate and rhythm; No murmurs, rubs, or gallops  ABDOMEN: Soft, Nontender, Nondistended; Bowel sounds present  EXTREMITIES:  2+ Peripheral Pulses, No clubbing, cyanosis, or edema  PSYCH: AAOx3  NEUROLOGY: non-focal  SKIN: No rashes or lesions    LABS:                        7.9    8.24  )-----------( 423      ( 16 Mar 2021 06:53 )             30.8     03-16    139  |  91<L>  |  14  ----------------------------<  70  4.8   |  34<H>  |  0.96    Ca    9.5      16 Mar 2021 06:52  Phos  5.1     03-15  Mg     2.0     03-15    TPro  6.5  /  Alb  3.4  /  TBili  0.6  /  DBili  x   /  AST  24  /  ALT  21  /  AlkPhos  56  03-16    PT/INR - ( 15 Mar 2021 09:01 )   PT: 13.3 sec;   INR: 1.11 ratio         PTT - ( 15 Mar 2021 09:01 )  PTT:27.4 sec      Urinalysis Basic - ( 16 Mar 2021 06:52 )    Color: Yellow / Appearance: Slightly Turbid / S.020 / pH: x  Gluc: x / Ketone: Negative  / Bili: Negative / Urobili: Negative   Blood: x / Protein: 30 mg/dL / Nitrite: Negative   Leuk Esterase: Large / RBC: 0 /hpf / WBC 11 /HPF   Sq Epi: x / Non Sq Epi: 8 /hpf / Bacteria: Negative        RADIOLOGY & ADDITIONAL TESTS:    Imaging Personally Reviewed:    Consultant(s) Notes Reviewed:      Care Discussed with Consultants/Other Providers:

## 2021-03-19 NOTE — PROGRESS NOTE ADULT - PROBLEM SELECTOR PROBLEM 3
Anemia due to acute blood loss

## 2021-03-19 NOTE — PROGRESS NOTE ADULT - PROBLEM SELECTOR PLAN 2
As above, pulmonary is following
Suggest to continue medications, monitoring, FU primary team/Pulm recommendations.
As above, pulmonary is following

## 2021-03-20 ENCOUNTER — TRANSCRIPTION ENCOUNTER (OUTPATIENT)
Age: 46
End: 2021-03-20

## 2021-03-20 VITALS — HEART RATE: 79 BPM | OXYGEN SATURATION: 98 %

## 2021-03-20 PROCEDURE — 84702 CHORIONIC GONADOTROPIN TEST: CPT

## 2021-03-20 PROCEDURE — 99291 CRITICAL CARE FIRST HOUR: CPT

## 2021-03-20 PROCEDURE — 82533 TOTAL CORTISOL: CPT

## 2021-03-20 PROCEDURE — 84484 ASSAY OF TROPONIN QUANT: CPT

## 2021-03-20 PROCEDURE — 82803 BLOOD GASES ANY COMBINATION: CPT

## 2021-03-20 PROCEDURE — 84244 ASSAY OF RENIN: CPT

## 2021-03-20 PROCEDURE — 86901 BLOOD TYPING SEROLOGIC RH(D): CPT

## 2021-03-20 PROCEDURE — 84132 ASSAY OF SERUM POTASSIUM: CPT

## 2021-03-20 PROCEDURE — U0003: CPT

## 2021-03-20 PROCEDURE — 83880 ASSAY OF NATRIURETIC PEPTIDE: CPT

## 2021-03-20 PROCEDURE — 80048 BASIC METABOLIC PNL TOTAL CA: CPT

## 2021-03-20 PROCEDURE — 86850 RBC ANTIBODY SCREEN: CPT

## 2021-03-20 PROCEDURE — 94660 CPAP INITIATION&MGMT: CPT

## 2021-03-20 PROCEDURE — 85730 THROMBOPLASTIN TIME PARTIAL: CPT

## 2021-03-20 PROCEDURE — 82306 VITAMIN D 25 HYDROXY: CPT

## 2021-03-20 PROCEDURE — 82652 VIT D 1 25-DIHYDROXY: CPT

## 2021-03-20 PROCEDURE — U0005: CPT

## 2021-03-20 PROCEDURE — 71275 CT ANGIOGRAPHY CHEST: CPT

## 2021-03-20 PROCEDURE — 82947 ASSAY GLUCOSE BLOOD QUANT: CPT

## 2021-03-20 PROCEDURE — 83605 ASSAY OF LACTIC ACID: CPT

## 2021-03-20 PROCEDURE — 85018 HEMOGLOBIN: CPT

## 2021-03-20 PROCEDURE — 82330 ASSAY OF CALCIUM: CPT

## 2021-03-20 PROCEDURE — 86900 BLOOD TYPING SEROLOGIC ABO: CPT

## 2021-03-20 PROCEDURE — 84295 ASSAY OF SERUM SODIUM: CPT

## 2021-03-20 PROCEDURE — 82728 ASSAY OF FERRITIN: CPT

## 2021-03-20 PROCEDURE — 82565 ASSAY OF CREATININE: CPT

## 2021-03-20 PROCEDURE — 82310 ASSAY OF CALCIUM: CPT

## 2021-03-20 PROCEDURE — 85025 COMPLETE CBC W/AUTO DIFF WBC: CPT

## 2021-03-20 PROCEDURE — 83735 ASSAY OF MAGNESIUM: CPT

## 2021-03-20 PROCEDURE — 81001 URINALYSIS AUTO W/SCOPE: CPT

## 2021-03-20 PROCEDURE — 82088 ASSAY OF ALDOSTERONE: CPT

## 2021-03-20 PROCEDURE — 83540 ASSAY OF IRON: CPT

## 2021-03-20 PROCEDURE — 84443 ASSAY THYROID STIM HORMONE: CPT

## 2021-03-20 PROCEDURE — 36600 WITHDRAWAL OF ARTERIAL BLOOD: CPT

## 2021-03-20 PROCEDURE — 85014 HEMATOCRIT: CPT

## 2021-03-20 PROCEDURE — 85610 PROTHROMBIN TIME: CPT

## 2021-03-20 PROCEDURE — 80053 COMPREHEN METABOLIC PANEL: CPT

## 2021-03-20 PROCEDURE — 86769 SARS-COV-2 COVID-19 ANTIBODY: CPT

## 2021-03-20 PROCEDURE — C8929: CPT

## 2021-03-20 PROCEDURE — 85027 COMPLETE CBC AUTOMATED: CPT

## 2021-03-20 PROCEDURE — 82962 GLUCOSE BLOOD TEST: CPT

## 2021-03-20 PROCEDURE — 83970 ASSAY OF PARATHORMONE: CPT

## 2021-03-20 PROCEDURE — 82435 ASSAY OF BLOOD CHLORIDE: CPT

## 2021-03-20 PROCEDURE — 82024 ASSAY OF ACTH: CPT

## 2021-03-20 PROCEDURE — 70450 CT HEAD/BRAIN W/O DYE: CPT

## 2021-03-20 PROCEDURE — 93970 EXTREMITY STUDY: CPT

## 2021-03-20 PROCEDURE — 83550 IRON BINDING TEST: CPT

## 2021-03-20 PROCEDURE — 84100 ASSAY OF PHOSPHORUS: CPT

## 2021-03-20 RX ORDER — NYSTATIN CREAM 100000 [USP'U]/G
1 CREAM TOPICAL
Qty: 1 | Refills: 0
Start: 2021-03-20 | End: 2021-04-18

## 2021-03-20 RX ORDER — ERGOCALCIFEROL 1.25 MG/1
1 CAPSULE ORAL
Qty: 7 | Refills: 0
Start: 2021-03-20 | End: 2021-03-26

## 2021-03-20 RX ORDER — FERROUS SULFATE 325(65) MG
1 TABLET ORAL
Qty: 0 | Refills: 0 | DISCHARGE

## 2021-03-20 RX ADMIN — HEPARIN SODIUM 5000 UNIT(S): 5000 INJECTION INTRAVENOUS; SUBCUTANEOUS at 05:23

## 2021-03-20 RX ADMIN — NYSTATIN CREAM 1 APPLICATION(S): 100000 CREAM TOPICAL at 05:23

## 2021-03-20 NOTE — PROGRESS NOTE ADULT - SUBJECTIVE AND OBJECTIVE BOX
S: SOB improved. Denies chest pain or palpitations. Review of systems otherwise (-)    Review of Systems:   Constitutional: [ ] fevers, [ ] chills.   Skin: [ ] dry skin. [ ] rashes.  Psychiatric: [ ] depression, [ ] anxiety.   Gastrointestinal: [ ] BRBPR, [ ] melena.   Neurological: [ ] confusion. [ ] seizures. [ ] shuffling gait.   Ears,Nose,Mouth and Throat: [ ] ear pain [ ] sore throat.   Eyes: [ ] diplopia.   Respiratory: [ ] hemoptysis. [ ] shortness of breath  Cardiovascular: See HPI above  Hematologic/Lymphatic: [ ] anemia. [ ] painful nodes. [ ] prolonged bleeding.   Genitourinary: [ ] hematuria. [ ] flank pain.   Endocrine: [ ] significant change in weight. [ ] intolerance to heat and cold.     Review of systems [x ] otherwise negative, [ ] otherwise unable to obtain    FH: no family history of sudden cardiac death in first degree relatives    SH: [ ] tobacco, [ ] alcohol, [ ] drugs         ergocalciferol 94843 Unit(s) Oral <User Schedule>  heparin   Injectable 5000 Unit(s) SubCutaneous every 12 hours  nystatin Powder 1 Application(s) Topical two times a day                            7.3    4.67  )-----------( 259      ( 19 Mar 2021 07:06 )             28.4       Hemoglobin: 7.3 g/dL (03-19 @ 07:06)  Hemoglobin: 7.6 g/dL (03-18 @ 05:19)  Hemoglobin: 7.8 g/dL (03-17 @ 16:16)  Hemoglobin: 7.7 g/dL (03-17 @ 07:00)  Hemoglobin: 7.9 g/dL (03-16 @ 06:53)      03-19    138  |  93<L>  |  10  ----------------------------<  82  3.8   |  33<H>  |  0.73    Ca    9.7      19 Mar 2021 07:03  Mg     2.1     03-19      Creatinine Trend: 0.73<--, 0.76<--, 0.81<--, 0.96<--, 0.71<--, 0.76<--    COAGS:           T(C): 36.7 (03-20-21 @ 04:34), Max: 37 (03-19-21 @ 21:42)  HR: 81 (03-20-21 @ 04:34) (81 - 97)  BP: 103/69 (03-20-21 @ 04:34) (96/63 - 113/76)  RR: 18 (03-20-21 @ 04:34) (18 - 18)  SpO2: 100% (03-20-21 @ 04:34) (96% - 100%)  Wt(kg): --    I&O's Summary    19 Mar 2021 07:01  -  20 Mar 2021 07:00  --------------------------------------------------------  IN: 660 mL / OUT: 0 mL / NET: 660 mL      General: Well nourished in no acute distress. Alert and Oriented * 3.   Head: Normocephalic and atraumatic.   Neck: No JVD. No bruits. Supple. Does not appear to be enlarged.   Cardiovascular: + S1,S2 ; RRR Soft systolic murmur at the left lower sternal border. No rubs noted.    Lungs: CTA b/l. No rhonchi, rales or wheezes.   Abdomen: + BS, soft. Non tender. Non distended. No rebound. No guarding.   Extremities: No clubbing/cyanosis/edema.   Neurologic: Moves all four extremities. Full range of motion.   Skin: Warm and moist. The patient's skin has normal elasticity and good skin turgor.   Psychiatric: Appropriate mood and affect.  Musculoskeletal: Normal range of motion, normal strength    TELEMETRY: SR/ST 80-100s    ECG: Sinus tach 102 BPM    < from: TTE with Doppler (w/Cont) (03.17.21 @ 07:51) >  Conclusions:  1. Normal mitral valve. Minimal mitral regurgitation.  2. Normal trileaflet aortic valve. No aortic valve  regurgitation seen.  3. Endocardial visualization enhanced with intravenous  injection of Ultrasonic Enhancing Agent (Definity). Normal  left ventricular systolic function. No segmental wall  motion abnormalities.  4. The right ventricle is not well visualized on axis;  normal right ventricular systolic function. The right  ventricle appears mildly enlarged on certain views; may be  due to off-axis imaging.  *** No previous Echo exam.    < end of copied text >      RADIOLOGY:      CTA: (-) PA dilated PA    ASSESSMENT/PLAN: 45y Female admitted with insomnia, SOB, and hypercarbia found with Dilated PA ,  PE on  CTPA    - Tele stable in sinus  - Bipap support   - No pulmonary edema on CTA, no fluid overload on exam  - LE doppler neg for DVT  - Pulmonary f/u  - Renal follow up   - TTE with normal LV/RV function, normal pulm pressures  - No further inpatient cardiac w/u planned  - Patient to follow up in our [place] office with Dr. Najera on 3/31 at 10:20 AM ([address], [address], [place] [address] - Office #[phone])    S: SOB improved. Denies chest pain or palpitations. Review of systems otherwise (-)    Review of Systems:   Constitutional: [ ] fevers, [ ] chills.   Skin: [ ] dry skin. [ ] rashes.  Psychiatric: [ ] depression, [ ] anxiety.   Gastrointestinal: [ ] BRBPR, [ ] melena.   Neurological: [ ] confusion. [ ] seizures. [ ] shuffling gait.   Ears,Nose,Mouth and Throat: [ ] ear pain [ ] sore throat.   Eyes: [ ] diplopia.   Respiratory: [ ] hemoptysis. [ ] shortness of breath  Cardiovascular: See HPI above  Hematologic/Lymphatic: [ ] anemia. [ ] painful nodes. [ ] prolonged bleeding.   Genitourinary: [ ] hematuria. [ ] flank pain.   Endocrine: [ ] significant change in weight. [ ] intolerance to heat and cold.     Review of systems [x ] otherwise negative, [ ] otherwise unable to obtain    FH: no family history of sudden cardiac death in first degree relatives    SH: [ ] tobacco, [ ] alcohol, [ ] drugs         ergocalciferol 09559 Unit(s) Oral <User Schedule>  heparin   Injectable 5000 Unit(s) SubCutaneous every 12 hours  nystatin Powder 1 Application(s) Topical two times a day                            7.3    4.67  )-----------( 259      ( 19 Mar 2021 07:06 )             28.4       Hemoglobin: 7.3 g/dL (03-19 @ 07:06)  Hemoglobin: 7.6 g/dL (03-18 @ 05:19)  Hemoglobin: 7.8 g/dL (03-17 @ 16:16)  Hemoglobin: 7.7 g/dL (03-17 @ 07:00)  Hemoglobin: 7.9 g/dL (03-16 @ 06:53)      03-19    138  |  93<L>  |  10  ----------------------------<  82  3.8   |  33<H>  |  0.73    Ca    9.7      19 Mar 2021 07:03  Mg     2.1     03-19      Creatinine Trend: 0.73<--, 0.76<--, 0.81<--, 0.96<--, 0.71<--, 0.76<--    COAGS:           T(C): 36.7 (03-20-21 @ 04:34), Max: 37 (03-19-21 @ 21:42)  HR: 81 (03-20-21 @ 04:34) (81 - 97)  BP: 103/69 (03-20-21 @ 04:34) (96/63 - 113/76)  RR: 18 (03-20-21 @ 04:34) (18 - 18)  SpO2: 100% (03-20-21 @ 04:34) (96% - 100%)  Wt(kg): --    I&O's Summary    19 Mar 2021 07:01  -  20 Mar 2021 07:00  --------------------------------------------------------  IN: 660 mL / OUT: 0 mL / NET: 660 mL      General: Well nourished in no acute distress. Alert and Oriented * 3.   Head: Normocephalic and atraumatic.   Neck: No JVD. No bruits. Supple. Does not appear to be enlarged.   Cardiovascular: + S1,S2 ; RRR Soft systolic murmur at the left lower sternal border. No rubs noted.    Lungs: CTA b/l. No rhonchi, rales or wheezes.   Abdomen: + BS, soft. Non tender. Non distended. No rebound. No guarding.   Extremities: No clubbing/cyanosis/edema.   Neurologic: Moves all four extremities. Full range of motion.   Skin: Warm and moist. The patient's skin has normal elasticity and good skin turgor.   Psychiatric: Appropriate mood and affect.  Musculoskeletal: Normal range of motion, normal strength    TELEMETRY: SR/ST 80-100s    ECG: Sinus tach 102 BPM    < from: TTE with Doppler (w/Cont) (03.17.21 @ 07:51) >  Conclusions:  1. Normal mitral valve. Minimal mitral regurgitation.  2. Normal trileaflet aortic valve. No aortic valve  regurgitation seen.  3. Endocardial visualization enhanced with intravenous  injection of Ultrasonic Enhancing Agent (Definity). Normal  left ventricular systolic function. No segmental wall  motion abnormalities.  4. The right ventricle is not well visualized on axis;  normal right ventricular systolic function. The right  ventricle appears mildly enlarged on certain views; may be  due to off-axis imaging.  *** No previous Echo exam.    < end of copied text >      RADIOLOGY:      CTA: (-) PA dilated PA    ASSESSMENT/PLAN: 45y Female admitted with insomnia, SOB, and hypercarbia found with Dilated PA ,  PE on  CTPA    - Tele stable in sinus  - No pulmonary edema on CTA, no fluid overload on exam  - LE doppler neg for DVT  - Pulmonary f/u  - Renal follow up   - TTE with normal LV/RV function, normal pulm pressures  - No further inpatient cardiac w/u planned  - Patient to follow up in our [place] office with Dr. Najera on 3/31 at 10:20 AM ([address], [address], [place] [address] - Office #[phone])

## 2021-03-20 NOTE — DISCHARGE NOTE PROVIDER - CARE PROVIDER_API CALL
Sherwin Dunaway)  Nephrology  [address] [place] [address], [address]nd [place] [address]  Phone: [phone]  Fax: [phone]  Follow Up Time: 2 weeks    Evan Hewitt)  Critical Care Medicine; Internal Medicine; Pulmonary Disease  [address] [place] [address]  Phone: [phone]  Fax: [phone]  Follow Up Time: 1 week    Viridiana Zuniga)  Cardiovascular Disease; Internal Medicine  [address] [facility], [address]  [place] [address]  Phone: [phone]  Fax: [phone]  Scheduled Appointment: 03/31/2021 10:20 AM   Sherwin Dunaway)  Nephrology  [address] [facility], [address]nd [place] [address]  Phone: [phone]  Fax: [phone]  Follow Up Time: 2 weeks    Evan Hewitt)  Critical Care Medicine; Internal Medicine; Pulmonary Disease  [address] [place] [address]  Phone: [phone]  Fax: [phone]  Follow Up Time: 1 week    Viridiana Zuniga)  Cardiovascular Disease; Internal Medicine  [address] [place] [address], [address]  [place] [address]  Phone: [phone]  Fax: [phone]  Scheduled Appointment: 03/31/2021 10:20 AM    Winston Bright)  EndocrinologyMetabDiabetes; Internal Medicine  [address] [place] [id number]  Phone: [phone]  Fax: [phone]  Follow Up Time: 1 month

## 2021-03-20 NOTE — DISCHARGE NOTE PROVIDER - DETAILS OF MALNUTRITION DIAGNOSIS/DIAGNOSES
This patient has been assessed with a concern for Malnutrition and was treated during this hospitalization for the following Nutrition diagnosis/diagnoses:     -  03/18/2021: Morbid obesity (BMI > 40)   This patient has been assessed with a concern for Malnutrition and was treated during this hospitalization for the following Nutrition diagnosis/diagnoses:     -  03/18/2021: Morbid obesity (BMI > 40)    This patient has been assessed with a concern for Malnutrition and was treated during this hospitalization for the following Nutrition diagnosis/diagnoses:     -  03/18/2021: Morbid obesity (BMI > 40)

## 2021-03-20 NOTE — DISCHARGE NOTE NURSING/CASE MANAGEMENT/SOCIAL WORK - PATIENT PORTAL LINK FT
You can access the FollowMyHealth Patient Portal offered by [facility] by registering at the following website: [email or web address]. By joining [employer]’s FollowMyHealth portal, you will also be able to view your health information using other applications (apps) compatible with our system.

## 2021-03-20 NOTE — PROGRESS NOTE ADULT - ATTENDING COMMENTS
Hyperparathyroidism: From Vit D deficiency  DC home
Sherwin Dunaway MD  Cell   Pager   Office 
Sherwin Dunaway MD  Cell   Pager   Office 
Patient seen and examined, agree with above assessment and plan as transcribed above.    - echo with no pertinent findings  - oupt sleep study  - pulmonary f/u    Brett Mantilla MD, FACC  BEEPER [phone]
Patient seen and examined.  Agree with above.   TTE with normal LV function and normal pulmonary pressures   Suspect symptoms secondary to underlying pulmonary issues  No further inpatient cardiac workup needed at this time.     Chika Lovelace MD
Agree with above assessment and plan as outlined above.    - oupt sleep study  - f/u with Dr. Dania Mantilla MD, [facility]  BEEPER [phone]

## 2021-03-20 NOTE — DISCHARGE NOTE PROVIDER - NSDCCPCAREPLAN_GEN_ALL_CORE_FT
PRINCIPAL DISCHARGE DIAGNOSIS  Diagnosis: Acute on chronic respiratory failure with hypercapnia  Assessment and Plan of Treatment: You have obesity hypoventil;ation syndrome  You need AVAPS at night time and [id number] the day      SECONDARY DISCHARGE DIAGNOSES  Diagnosis: Iron deficiency anemia  Assessment and Plan of Treatment: continue iron tablets  Follow up with GYN    Diagnosis: Elevated parathyroid hormone  Assessment and Plan of Treatment: continue high dose vitramin D x 8 doses and follow up with endocrinology for repeat labs     PRINCIPAL DISCHARGE DIAGNOSIS  Diagnosis: Acute on chronic respiratory failure with hypercapnia  Assessment and Plan of Treatment: You have obesity hypoventilation syndrome  You need AVAPS at night time and [id number] the day  Follow up with pulmonology in 1 week      SECONDARY DISCHARGE DIAGNOSES  Diagnosis: Iron deficiency anemia  Assessment and Plan of Treatment: continue iron tablets  Follow up with GYN    Diagnosis: Elevated parathyroid hormone  Assessment and Plan of Treatment: continue high dose vitramin D x 8 doses and follow up with endocrinology for repeat labs

## 2021-03-20 NOTE — PROGRESS NOTE ADULT - NUTRITIONAL ASSESSMENT
This patient has been assessed with a concern for Malnutrition and has been determined to have a diagnosis/diagnoses of Morbid obesity (BMI > 40).    This patient is being managed with:   Diet DASH/TLC-  Sodium & Cholesterol Restricted  Entered: Mar 18 2021  4:56PM    
This patient has been assessed with a concern for Malnutrition and has been determined to have a diagnosis/diagnoses of Morbid obesity (BMI > 40).    This patient is being managed with:   Diet Regular-  Entered: Mar 16 2021 11:31AM    
This patient has been assessed with a concern for Malnutrition and has been determined to have a diagnosis/diagnoses of Morbid obesity (BMI > 40).    This patient is being managed with:   Diet DASH/TLC-  Sodium & Cholesterol Restricted  Entered: Mar 18 2021  4:56PM

## 2021-03-20 NOTE — PROGRESS NOTE ADULT - PROVIDER SPECIALTY LIST ADULT
Cardiology
Hospitalist
Nephrology
Cardiology
Nephrology
Internal Medicine
Pulmonology
Internal Medicine
Nephrology
Pulmonology
Endocrinology
Internal Medicine
Internal Medicine

## 2021-03-20 NOTE — DISCHARGE NOTE PROVIDER - PROVIDER TOKENS
PROVIDER:[TOKEN:[[id number]],FOLLOWUP:[2 weeks]],PROVIDER:[TOKEN:[[id number]],FOLLOWUP:[1 week]],PROVIDER:[TOKEN:[[id number]],SCHEDULEDAPPT:[03/31/2021],SCHEDULEDAPPTTIME:[10:20 AM]] PROVIDER:[TOKEN:[[id number]],FOLLOWUP:[2 weeks]],PROVIDER:[TOKEN:[[id number]],FOLLOWUP:[1 week]],PROVIDER:[TOKEN:[[id number]],SCHEDULEDAPPT:[03/31/2021],SCHEDULEDAPPTTIME:[10:20 AM]],PROVIDER:[TOKEN:[[id number]],FOLLOWUP:[1 month]]

## 2021-03-20 NOTE — DISCHARGE NOTE PROVIDER - NSDCMRMEDTOKEN_GEN_ALL_CORE_FT
ergocalciferol 50,000 intl units (1.25 mg) oral capsule: 1 cap(s) orally once a week x 7 weeks  ferrous sulfate 325 mg (65 mg elemental iron) oral tablet: 1 tab(s) orally 2 times a day  nystatin 100,000 units/g topical powder: 1 application topically 2 times a day

## 2021-03-20 NOTE — DISCHARGE NOTE PROVIDER - CARE PROVIDERS DIRECT ADDRESSES
,[email or web address],DirectAddress_Unknown,DirectAddress_Unknown ,[email or web address],DirectAddress_Unknown,DirectAddress_Unknown,DirectAddress_Unknown

## 2021-03-20 NOTE — DISCHARGE NOTE PROVIDER - HOSPITAL COURSE
46 y/o female with PMHx fibroids s/p myomectomy x2 PSx , hx of PE, of pre-eclampsia works from home now presenting to the ED with SOB & intermittent confusion x2 weeks and anxiety causing insomnia x2 weeks. Pt was suspected to have OHS/ ARVIND, seen by pulmonary and  started on AVAPS & given IV lasix x 2 doses. CTA  with Dilated PA 9-) and no PE. Nephrology was called due to pt's acid base disturbance. Acute on chronic hypercapnic respiratory failure likely from OHS/ ARVIND- now with overcompensation from AVAPS & metabolic (contraction) alkalosis due to lasix x 2 doses. lasix held. No signs of secondary hyperaldo state ,Cortisol normal, less likely Cushing's and Pt appears euvolemic on exam,. Noted to have  Elevated PTH- this is likely in setting of severe vitamin D def, plan to  replete ergocalciferol 50,000 units once a week for 8 weeks and then repeat PTH; Endocrine agrees. LE doppler neg for DVT. TTE with normal LV/RV function, normal pulm pressures. - No further inpatient cardiac w/u planned. note dto have severe iron deficiency anemia sec to uterine fibroids. Needs outpt follow up. Pt mildly hypoxic on RA at rest. Needs home o2. Discharged home with home 02 , AVAPS at night time, pulmonary, cardiology and nephrology follow up.

## 2021-03-24 PROBLEM — D21.9 BENIGN NEOPLASM OF CONNECTIVE AND OTHER SOFT TISSUE, UNSPECIFIED: Chronic | Status: ACTIVE | Noted: 2021-03-15

## 2021-03-25 LAB — RENIN PLAS-CCNC: 4.8 NG/ML/HR — SIGNIFICANT CHANGE UP (ref 0.17–5.38)

## 2021-04-21 ENCOUNTER — APPOINTMENT (OUTPATIENT)
Dept: NEPHROLOGY | Facility: CLINIC | Age: 46
End: 2021-04-21
Payer: COMMERCIAL

## 2021-04-21 ENCOUNTER — TRANSCRIPTION ENCOUNTER (OUTPATIENT)
Age: 46
End: 2021-04-21

## 2021-04-21 DIAGNOSIS — E87.2 ACIDOSIS: ICD-10-CM

## 2021-04-21 DIAGNOSIS — E87.3 ALKALOSIS: ICD-10-CM

## 2021-04-21 DIAGNOSIS — Z87.59 PERSONAL HISTORY OF OTHER COMPLICATIONS OF PREGNANCY, CHILDBIRTH AND THE PUERPERIUM: ICD-10-CM

## 2021-04-21 DIAGNOSIS — Z86.018 PERSONAL HISTORY OF OTHER BENIGN NEOPLASM: ICD-10-CM

## 2021-04-21 PROCEDURE — 99214 OFFICE O/P EST MOD 30 MIN: CPT | Mod: 95

## 2021-04-21 NOTE — ASSESSMENT
[FreeTextEntry1] : Ms. HENRIQUEZ is a 45 year old female with recently dx ARVIND/OHS now on CPAP and oxygen and renal was consulted in [facility] for resp acidosis, met alk and that resolved with removal of diuresis and resp acidosis improved with pulm management, Renal function was always normal\par \par Acid-base: Resolved but at baseline will have some compensatory met alk for her resp disease\par Pulm to see pt soon- referral given\par \par PTH elevation in in >100 range, this might be due to her 25 vitamin D def. Started on ergo vitamin D 50,000 units for 8 weeks ( once a week) and recheck PTH. Endo f.u required. If still high, will need workup for primary pth disease. No signs of CKD\par \par Cards: pt saw cards and had neg stress test and ECHO\par \par F>u renal as needed\par Needs PMD, Endo and Pulm appts\par

## 2021-04-21 NOTE — HISTORY OF PRESENT ILLNESS
[FreeTextEntry1] : 44 y/o female with PMHx fibroids s/p myomectomy x2 PSx , hx of PE, of pre-eclampsia works from home now presenting to the ED with SOB & intermittent confusion x2 weeks and anxiety causing insomnia x2 weeks. Pt was started on AVAPS due to respiratory acidosis while in house & given IV lasix x 2 doses for suspected PHTN & volume overload. Nephrology was called due to pt's acid base disturbance. Pt denies using CPAP at home. \par Does not take any routine meds. No chest pain, palpitations, light headedness/dizziness, cough, fevers/chills, abdominal pain, n/v, diarrhea/constipation, dysuria or increased urinary frequency.\par Resp acidosis with chronic and acute met alk- chronic is compensatory from the resp acidosis. Given low chloride state and drop of pco2 from 90 to 45, likely post hypercapnic met alk vs diuretic related contraction then. She normalized to a co2 of 33 on dc after diuresis was stopped. No signs of secondary hyperaldo state and renin/diego were ok and ACTH and cortisol normal.  Noted to have  Elevated PTH- this is likely in setting of severe vitamin \par D def, plan to  replete ergocalciferol 50,000 units once a week for 8 weeks and \par then repeat PTH. She did well and was sent home on CPAP and oxygen. She followed up with cardiology and had an ECHO and stress test- neg but dropped her oxygen requiring oxygen. She did follow up with pulm but they didn't take her insurance. She has no PMD and no f.u with endocrine either. She is here for f/.u for her acid-base disturbance. \par \par \par PMH:\par Fibroids\par H/O: \par H/O myomectomy

## 2021-04-21 NOTE — REASON FOR VISIT
[Post Hospitalization] : a post hospitalization visit [Home] : at home, [unfilled] , at the time of the visit. [Medical Office: ([facility])___] : at the medical office located in  [Verbal consent obtained from patient] : the patient, [unfilled]

## 2021-04-22 ENCOUNTER — TRANSCRIPTION ENCOUNTER (OUTPATIENT)
Age: 46
End: 2021-04-22

## 2021-05-10 ENCOUNTER — APPOINTMENT (OUTPATIENT)
Dept: DISASTER EMERGENCY | Facility: CLINIC | Age: 46
End: 2021-05-10

## 2021-05-10 LAB — SARS-COV-2 N GENE NPH QL NAA+PROBE: NOT DETECTED

## 2021-05-12 ENCOUNTER — LABORATORY RESULT (OUTPATIENT)
Age: 46
End: 2021-05-12

## 2021-05-13 ENCOUNTER — APPOINTMENT (OUTPATIENT)
Dept: PULMONOLOGY | Facility: CLINIC | Age: 46
End: 2021-05-13
Payer: COMMERCIAL

## 2021-05-13 VITALS
BODY MASS INDEX: 42.21 KG/M2 | DIASTOLIC BLOOD PRESSURE: 96 MMHG | SYSTOLIC BLOOD PRESSURE: 149 MMHG | TEMPERATURE: 98.2 F | WEIGHT: 285 LBS | HEIGHT: 69 IN | HEART RATE: 106 BPM | RESPIRATION RATE: 16 BRPM | OXYGEN SATURATION: 95 %

## 2021-05-13 VITALS
WEIGHT: 285 LBS | TEMPERATURE: 98.2 F | BODY MASS INDEX: 42.21 KG/M2 | OXYGEN SATURATION: 97 % | HEIGHT: 69 IN | HEART RATE: 110 BPM

## 2021-05-13 DIAGNOSIS — R06.83 SNORING: ICD-10-CM

## 2021-05-13 DIAGNOSIS — E66.2 MORBID (SEVERE) OBESITY WITH ALVEOLAR HYPOVENTILATION: ICD-10-CM

## 2021-05-13 LAB
25(OH)D3 SERPL-MCNC: 24.3 NG/ML
ALBUMIN SERPL ELPH-MCNC: 4.1 G/DL
ANION GAP SERPL CALC-SCNC: 11 MMOL/L
APPEARANCE: CLEAR
BACTERIA: NEGATIVE
BASOPHILS # BLD AUTO: 0.02 K/UL
BASOPHILS NFR BLD AUTO: 0.4 %
BILIRUBIN URINE: NEGATIVE
BLOOD URINE: NEGATIVE
BUN SERPL-MCNC: 10 MG/DL
CALCIUM OXALATE CRYSTALS: ABNORMAL
CALCIUM SERPL-MCNC: 9.6 MG/DL
CALCIUM SERPL-MCNC: 9.6 MG/DL
CHLORIDE SERPL-SCNC: 99 MMOL/L
CO2 SERPL-SCNC: 32 MMOL/L
COLOR: YELLOW
CREAT SERPL-MCNC: 0.71 MG/DL
CREAT SPEC-SCNC: 226 MG/DL
CREAT/PROT UR: 0.1 RATIO
EOSINOPHIL # BLD AUTO: 0.05 K/UL
EOSINOPHIL NFR BLD AUTO: 1.1 %
FERRITIN SERPL-MCNC: 64 NG/ML
GLUCOSE QUALITATIVE U: NEGATIVE
GLUCOSE SERPL-MCNC: 84 MG/DL
HCT VFR BLD CALC: 42.1 %
HGB BLD-MCNC: 12.2 G/DL
HYALINE CASTS: 0 /LPF
IMM GRANULOCYTES NFR BLD AUTO: 0.2 %
IRON SATN MFR SERPL: 16 %
IRON SERPL-MCNC: 57 UG/DL
KETONES URINE: NEGATIVE
LEUKOCYTE ESTERASE URINE: NEGATIVE
LYMPHOCYTES # BLD AUTO: 1.58 K/UL
LYMPHOCYTES NFR BLD AUTO: 35.5 %
MAN DIFF?: NORMAL
MCHC RBC-ENTMCNC: 26.7 PG
MCHC RBC-ENTMCNC: 29 GM/DL
MCV RBC AUTO: 92.1 FL
MICROSCOPIC-UA: NORMAL
MONOCYTES # BLD AUTO: 0.6 K/UL
MONOCYTES NFR BLD AUTO: 13.5 %
NEUTROPHILS # BLD AUTO: 2.19 K/UL
NEUTROPHILS NFR BLD AUTO: 49.3 %
NITRITE URINE: NEGATIVE
PARATHYROID HORMONE INTACT: 84 PG/ML
PH URINE: 7
PHOSPHATE SERPL-MCNC: 4.3 MG/DL
PLATELET # BLD AUTO: 355 K/UL
POTASSIUM SERPL-SCNC: 4.4 MMOL/L
PROT UR-MCNC: 12 MG/DL
PROTEIN URINE: NORMAL
RBC # BLD: 4.57 M/UL
RBC # FLD: 25.3 %
RED BLOOD CELLS URINE: 0 /HPF
SODIUM SERPL-SCNC: 142 MMOL/L
SPECIFIC GRAVITY URINE: 1.02
SQUAMOUS EPITHELIAL CELLS: 1 /HPF
TIBC SERPL-MCNC: 349 UG/DL
UIBC SERPL-MCNC: 292 UG/DL
UROBILINOGEN URINE: ABNORMAL
WBC # FLD AUTO: 4.45 K/UL
WHITE BLOOD CELLS URINE: 2 /HPF

## 2021-05-13 PROCEDURE — 99205 OFFICE O/P NEW HI 60 MIN: CPT | Mod: 25

## 2021-05-13 PROCEDURE — 94729 DIFFUSING CAPACITY: CPT

## 2021-05-13 PROCEDURE — 94010 BREATHING CAPACITY TEST: CPT

## 2021-05-13 PROCEDURE — 94726 PLETHYSMOGRAPHY LUNG VOLUMES: CPT

## 2021-05-13 PROCEDURE — 99072 ADDL SUPL MATRL&STAF TM PHE: CPT

## 2021-05-13 PROCEDURE — ZZZZZ: CPT

## 2021-05-13 NOTE — REVIEW OF SYSTEMS
[Fatigue] : fatigue [EDS] : EDS [Dry Mouth] : dry mouth [SOB on Exertion] : sob on exertion [Negative] : Endocrine

## 2021-05-13 NOTE — HISTORY OF PRESENT ILLNESS
[Obesity Hypoventilation Syndrome] : obesity hypoventilation syndrome [Awakes with Dry Mouth] : awakes with dry mouth [Awakes with Headache] : awakes with headache [Daytime Somnolence] : daytime somnolence [Hypersomnolence] : hypersomnolence [Nonrestorative Sleep] : nonrestorative sleep [Snoring] : snoring [Tired while Driving] : tired while driving [TextBox_4] : 47 y/o F with PMH of uterine fibroids s/p myomectomy presented to the clinic for a post-hospital follow up.\par \par Of note, she was admitted from [facility] from 3/15-3/20 for acute hypoxic/hypercapnic respiratory failure.  CTA showed no e/o PE, ECHO was normal, dopplers negative, COVID PCR negative and the patient was discharged home on AVAPS.\par \par Since discharge, the patient has been doing well.  Her main symptoms prior to hospitalization were frequent nocturnal awakenings.  Her ESS was 16 prior to AVAPS and now is 2.  She noted AM headaches but with AVAPS she no longer has them.  She feels more refreshed.  Has some dyspnea but only when walking up a hill for a few miles. \par \par She is a non-smoker.  No work-related exposures.  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  Recent stress with cardiology was negative.\par \par ____________________________________________________________________\par EPWORTH SLEEPINESS SCALE (PRIOR TO AVAPS)\par \par How likely are you to doze off or fall asleep in the situations described below, in contrast to feeling just tired?  \par This refers to your usual way of life in recent times.  \par Even if you haven't done some of these things recently, try to work out how they would have affected you.\par Use the following scale to choose one most appropriate number for each situation.\par \par Chance of dozing.............Situation\par ..............3..........................Sitting and reading\par ..............3..........................Watching TV\par ..............0..........................Sitting inactive in a public place (eg a theatre or a meeting)\par ..............0..........................As a passenger in a car for an hour without a break\par ..............3..........................Lying down to rest in the afternoon when circumstances permit\par ..............3..........................Sitting and talking to someone\par ..............2..........................Sitting quietly after lunch without alcohol\par ..............2..........................In a car, while stopped for a few minutes in traffic\par \par .............16...........................TOTAL SCORE\par \par 0 = Never would doze\par 1 = Slight chance of dozing\par 2 = Moderate chance of dozing\par 3 = High chance of dozing \par ______________________________________________________________________ \par \par ____________________________________________________________________\par EPWORTH SLEEPINESS SCALE (ON THERAPY)\par \par How likely are you to doze off or fall asleep in the situations described below, in contrast to feeling just tired?  \par This refers to your usual way of life in recent times.  \par Even if you haven't done some of these things recently, try to work out how they would have affected you.\par Use the following scale to choose one most appropriate number for each situation.\par \par Chance of dozing.............Situation\par .............0...........................Sitting and reading\par .............1...........................Watching TV\par .............0...........................Sitting inactive in a public place (eg a theatre or a meeting)\par .............0...........................As a passenger in a car for an hour without a break\par .............1...........................Lying down to rest in the afternoon when circumstances permit\par .............0...........................Sitting and talking to someone\par .............0.........................Sitting quietly after lunch without alcohol\par .............0.........................In a car, while stopped for a few minutes in traffic\par \par .............2...........................TOTAL SCORE\par \par 0 = Never would doze\par 1 = Slight chance of dozing\par 2 = Moderate chance of dozing\par 3 = High chance of dozing \par ______________________________________________________________________  [ESS] : 16

## 2021-05-13 NOTE — ASSESSMENT
[FreeTextEntry1] : 45 y/o F with PMH of uterine fibroids s/p myomectomy presented to the clinic for a post-hospital follow up.\par \par She had a recent admission to [facility] from 3/15-3/20 for acute hypoxic/hypercapnic respiratory failure.  Her symptoms prior to her admission were progressive lethargy, EDS, frequent nocturnal awakenings and morning headaches.  Her serum bicarbonate on admission was 32.  Her ABG on presentation was 7.17/101/52 and after AVAPS therapy it was 7.42/68/78.  Clinically, she feels much improved with use.  Will reach out to her [employer] to obtain her compliance and therapeutic data.  Will plan to order an overnight pulse oximetry.  The dangers of drowsy driving were discussed with the patient.  The patient was warned to avoid drowsy driving. \par \par From a respiratory standpoint, she is doing well.  She was discharged on supplemental oxygen but only is using it intermittently.  In the office, she was saturating 94% on room air and when asked to take deep breathes she increased to 98%.  She was clear to auscultation on physical exam. She maintained her oxygen saturation with exertion.  CT-chest did not show any parenchymal abnormalities and just bibasilar atelectasis--no PE.  ECHO showed a nl LVEF and an RVSP of 27 mmHg.  PFTs showed restrictive physiology and a reduced ERV consistent with class 3 obesity.  Patient was counseled on weight loss and patient understood.  No further workup from a pulmonary standpoint at this time.\par \par Abdi Logan, DO\par Pulmonary, Critical Care and Sleep Medicine Attending \par   [Obesity, Class III, BMI 40-49.9 (E66.01)] : macrophage activation syndrome

## 2021-05-13 NOTE — PHYSICAL EXAM
[No Acute Distress] : no acute distress [Low Lying Soft Palate] : low lying soft palate [III] : Mallampati Class: III [Normal Appearance] : normal appearance [No Neck Mass] : no neck mass [Normal Rate/Rhythm] : normal rate/rhythm [Normal S1, S2] : normal s1, s2 [No Murmurs] : no murmurs [No Resp Distress] : no resp distress [Clear to Auscultation Bilaterally] : clear to auscultation bilaterally [No Abnormalities] : no abnormalities [Benign] : benign [Normal Gait] : normal gait [No Clubbing] : no clubbing [No Cyanosis] : no cyanosis [No Edema] : no edema [FROM] : FROM [Normal Color/ Pigmentation] : normal color/ pigmentation [No Focal Deficits] : no focal deficits [Oriented x3] : oriented x3 [Normal Affect] : normal affect

## 2021-05-24 ENCOUNTER — APPOINTMENT (OUTPATIENT)
Dept: ENDOCRINOLOGY | Facility: CLINIC | Age: 46
End: 2021-05-24
Payer: COMMERCIAL

## 2021-05-24 VITALS
HEART RATE: 110 BPM | DIASTOLIC BLOOD PRESSURE: 90 MMHG | WEIGHT: 283 LBS | HEIGHT: 69 IN | BODY MASS INDEX: 41.92 KG/M2 | SYSTOLIC BLOOD PRESSURE: 140 MMHG | OXYGEN SATURATION: 96 %

## 2021-05-24 DIAGNOSIS — Z80.1 FAMILY HISTORY OF MALIGNANT NEOPLASM OF TRACHEA, BRONCHUS AND LUNG: ICD-10-CM

## 2021-05-24 DIAGNOSIS — Z82.49 FAMILY HISTORY OF ISCHEMIC HEART DISEASE AND OTHER DISEASES OF THE CIRCULATORY SYSTEM: ICD-10-CM

## 2021-05-24 DIAGNOSIS — Z78.9 OTHER SPECIFIED HEALTH STATUS: ICD-10-CM

## 2021-05-24 PROCEDURE — 99242 OFF/OP CONSLTJ NEW/EST SF 20: CPT

## 2021-05-24 PROCEDURE — 99072 ADDL SUPL MATRL&STAF TM PHE: CPT

## 2021-05-24 NOTE — PHYSICAL EXAM
[Alert] : alert [Well Nourished] : well nourished [Healthy Appearance] : healthy appearance [Obese] : obese [No Acute Distress] : no acute distress [Well Developed] : well developed [Normal Sclera/Conjunctiva] : normal sclera/conjunctiva [EOMI] : extra ocular movement intact [No Proptosis] : no proptosis [Normal Outer Ear/Nose] : the ears and nose were normal in appearance [Normal Hearing] : hearing was normal [Normal Oropharynx] : the oropharynx was normal [Thyroid Not Enlarged] : the thyroid was not enlarged [No Respiratory Distress] : no respiratory distress [No Accessory Muscle Use] : no accessory muscle use [Normal Rate and Effort] : normal respiratory rate and effort [Clear to Auscultation] : lungs were clear to auscultation bilaterally [Normal S1, S2] : normal S1 and S2 [Normal Rate] : heart rate was normal [Regular Rhythm] : with a regular rhythm [Normal Bowel Sounds] : normal bowel sounds [Not Tender] : non-tender [Not Distended] : not distended [Soft] : abdomen soft [Normal Anterior Cervical Nodes] : no anterior cervical lymphadenopathy [Normal Posterior Cervical Nodes] : no posterior cervical lymphadenopathy [No Spinal Tenderness] : no spinal tenderness [Spine Straight] : spine straight [No Stigmata of Cushings Syndrome] : no stigmata of Cushings Syndrome [Normal Gait] : normal gait [Normal Strength/Tone] : muscle strength and tone were normal [No Rash] : no rash [No Tremors] : no tremors [Oriented x3] : oriented to person, place, and time [Normal Affect] : the affect was normal [Normal Insight/Judgement] : insight and judgment were intact [Normal Mood] : the mood was normal [Acanthosis Nigricans] : acanthosis nigricans present [de-identified] : left sided thyroid fullness on swallowing [de-identified] : trace ankle edema

## 2021-05-24 NOTE — HISTORY OF PRESENT ILLNESS
[FreeTextEntry1] : 47 y/o AA female here for evaluation of hyperparathyroidism\par \par She was metabolic acidosis, shortness of breath, confusion, in March 2021 and was diagnosed with ARVIND and not using a CPAP machine. She feels well and no confusion.\par \par Coincidentally diagnosed with hyperparathyroidism (117) with normal calcium levels (8.7) and low vitamin D 25-OH (9) in March 2021. She was started on Ergocalciferol 50,000 units once/week and she has been taking it. repeat labs in May 2021 with intact PTH (84), calcium (9), and vitamin D 25-OH (24). \par No h/o fractures\par No known FH of hypercalcemia\par No h/o kidney stones\par No constipation\par No h/o thyroid problems\par \par No h/o GDM \par Father with T2DM\par \par Meds: Ergocalciferol 50,000 units once/week and Iron supplement

## 2021-05-24 NOTE — ASSESSMENT
[Sick-Day Management] : sick-day management [FreeTextEntry1] : 45 y/o female with normocalcemic hyperparathyoidism and vitamin D deficiency\par \par Hyperparathyroidism\par - Recent labs with improvement in Viatmin D levels but still <30 and iPTH 84\par - Recommend continue Ergocalciferol 50,000 units once/week x mid July\par - Then recheck all levels. Avoid TUMS and stay hydrated.\par - Will recommend outpatient thyroid u/s with me in July at [address] location\par - Will recommend outpatient DXA at [address] location\par - Will recommend KUB for eval of kidney stones\par - Recommend 24 hour urine for calcium once Vitamin D is >30\par \par Will check labs in July including HbA1c and TSH and FT4, with CMP, intact PTH and Vitamin D25-OH\par F/u with me in mid-July at [address] for thyroid u/s and labs and DXA\par \par Kelby Bolton DO\par \par  \par

## 2021-05-24 NOTE — REVIEW OF SYSTEMS
[Irregular Menses] : irregular menses [Negative] : Heme/Lymph [All other systems negative] : All other systems negative

## 2021-07-15 ENCOUNTER — RX RENEWAL (OUTPATIENT)
Age: 46
End: 2021-07-15

## 2021-11-10 ENCOUNTER — APPOINTMENT (OUTPATIENT)
Dept: ENDOCRINOLOGY | Facility: CLINIC | Age: 46
End: 2021-11-10
Payer: COMMERCIAL

## 2021-11-10 VITALS
HEART RATE: 76 BPM | WEIGHT: 287 LBS | RESPIRATION RATE: 16 BRPM | HEIGHT: 68.5 IN | OXYGEN SATURATION: 99 % | BODY MASS INDEX: 43 KG/M2 | SYSTOLIC BLOOD PRESSURE: 138 MMHG | DIASTOLIC BLOOD PRESSURE: 92 MMHG | TEMPERATURE: 98 F

## 2021-11-10 DIAGNOSIS — E04.1 NONTOXIC SINGLE THYROID NODULE: ICD-10-CM

## 2021-11-10 DIAGNOSIS — E34.9 ENDOCRINE DISORDER, UNSPECIFIED: ICD-10-CM

## 2021-11-10 LAB
24R-OH-CALCIDIOL SERPL-MCNC: 78 PG/ML
25(OH)D3 SERPL-MCNC: 21.6 NG/ML
ALBUMIN SERPL ELPH-MCNC: 4.3 G/DL
ALP BLD-CCNC: 74 U/L
ALT SERPL-CCNC: 14 U/L
ANION GAP SERPL CALC-SCNC: 16 MMOL/L
AST SERPL-CCNC: 20 U/L
BILIRUB SERPL-MCNC: 0.3 MG/DL
BUN SERPL-MCNC: 13 MG/DL
CALCIUM SERPL-MCNC: 9.7 MG/DL
CALCIUM SERPL-MCNC: 9.7 MG/DL
CHLORIDE SERPL-SCNC: 99 MMOL/L
CO2 SERPL-SCNC: 28 MMOL/L
CREAT SERPL-MCNC: 0.71 MG/DL
ESTIMATED AVERAGE GLUCOSE: 100 MG/DL
GLUCOSE SERPL-MCNC: 82 MG/DL
HBA1C MFR BLD HPLC: 5.1 %
PARATHYROID HORMONE INTACT: 88 PG/ML
POTASSIUM SERPL-SCNC: 5.3 MMOL/L
PROT SERPL-MCNC: 7.3 G/DL
SODIUM SERPL-SCNC: 143 MMOL/L
T4 FREE SERPL-MCNC: 1.2 NG/DL
TSH SERPL-ACNC: 1.71 UIU/ML

## 2021-11-10 PROCEDURE — 76536 US EXAM OF HEAD AND NECK: CPT | Mod: 52

## 2021-11-10 PROCEDURE — ZZZZZ: CPT

## 2021-11-10 PROCEDURE — 77085 DXA BONE DENSITY AXL VRT FX: CPT

## 2021-11-10 PROCEDURE — 99214 OFFICE O/P EST MOD 30 MIN: CPT | Mod: 25

## 2021-12-08 ENCOUNTER — APPOINTMENT (OUTPATIENT)
Dept: FAMILY MEDICINE | Facility: CLINIC | Age: 46
End: 2021-12-08
Payer: COMMERCIAL

## 2021-12-08 VITALS
HEIGHT: 68.5 IN | WEIGHT: 287 LBS | DIASTOLIC BLOOD PRESSURE: 82 MMHG | TEMPERATURE: 97.7 F | SYSTOLIC BLOOD PRESSURE: 133 MMHG | BODY MASS INDEX: 43 KG/M2 | OXYGEN SATURATION: 96 %

## 2021-12-08 DIAGNOSIS — N91.2 AMENORRHEA, UNSPECIFIED: ICD-10-CM

## 2021-12-08 DIAGNOSIS — Z00.00 ENCOUNTER FOR GENERAL ADULT MEDICAL EXAMINATION W/OUT ABNORMAL FINDINGS: ICD-10-CM

## 2021-12-08 DIAGNOSIS — D50.9 IRON DEFICIENCY ANEMIA, UNSPECIFIED: ICD-10-CM

## 2021-12-08 DIAGNOSIS — Z11.3 ENCOUNTER FOR SCREENING FOR INFECTIONS WITH A PREDOMINANTLY SEXUAL MODE OF TRANSMISSION: ICD-10-CM

## 2021-12-08 PROCEDURE — 90686 IIV4 VACC NO PRSV 0.5 ML IM: CPT

## 2021-12-08 PROCEDURE — G0008: CPT

## 2021-12-08 PROCEDURE — 36415 COLL VENOUS BLD VENIPUNCTURE: CPT

## 2021-12-08 PROCEDURE — 99386 PREV VISIT NEW AGE 40-64: CPT | Mod: 25

## 2021-12-08 RX ORDER — ERGOCALCIFEROL 1.25 MG/1
1.25 MG CAPSULE ORAL
Qty: 12 | Refills: 0 | Status: DISCONTINUED | COMMUNITY
Start: 2021-04-21 | End: 2021-12-08

## 2021-12-09 LAB
25(OH)D3 SERPL-MCNC: 22.9 NG/ML
ALBUMIN SERPL ELPH-MCNC: 4.6 G/DL
ALP BLD-CCNC: 84 U/L
ALT SERPL-CCNC: 18 U/L
ANION GAP SERPL CALC-SCNC: 19 MMOL/L
APPEARANCE: CLEAR
AST SERPL-CCNC: 29 U/L
BASOPHILS # BLD AUTO: 0.02 K/UL
BASOPHILS NFR BLD AUTO: 0.4 %
BILIRUB SERPL-MCNC: 0.5 MG/DL
BILIRUBIN URINE: NEGATIVE
BLOOD URINE: NEGATIVE
BUN SERPL-MCNC: 12 MG/DL
C TRACH RRNA SPEC QL NAA+PROBE: NOT DETECTED
CALCIUM SERPL-MCNC: 9.7 MG/DL
CHLORIDE SERPL-SCNC: 98 MMOL/L
CHOLEST SERPL-MCNC: 252 MG/DL
CO2 SERPL-SCNC: 22 MMOL/L
COLOR: YELLOW
CREAT SERPL-MCNC: 0.66 MG/DL
EOSINOPHIL # BLD AUTO: 0.05 K/UL
EOSINOPHIL NFR BLD AUTO: 1.1 %
FERRITIN SERPL-MCNC: 28 NG/ML
FOLATE SERPL-MCNC: 10.2 NG/ML
FSH SERPL-MCNC: 11.2 IU/L
GLUCOSE QUALITATIVE U: NEGATIVE
GLUCOSE SERPL-MCNC: 79 MG/DL
HBV SURFACE AG SER QL: NONREACTIVE
HCG SERPL-MCNC: <1 MIU/ML
HCT VFR BLD CALC: 45.9 %
HCV AB SER QL: NONREACTIVE
HCV S/CO RATIO: 0.13 S/CO
HDLC SERPL-MCNC: 80 MG/DL
HGB BLD-MCNC: 13.5 G/DL
HIV1+2 AB SPEC QL IA.RAPID: NONREACTIVE
HSV 1+2 IGG SER IA-IMP: NEGATIVE
HSV 1+2 IGG SER IA-IMP: NEGATIVE
HSV1 IGG SER QL: 0.8 INDEX
HSV2 IGG SER QL: 0.1 INDEX
IMM GRANULOCYTES NFR BLD AUTO: 0.2 %
INSULIN SERPL-MCNC: 11.8 UU/ML
IRON SATN MFR SERPL: 15 %
IRON SERPL-MCNC: 65 UG/DL
KETONES URINE: NEGATIVE
LDLC SERPL CALC-MCNC: 163 MG/DL
LEUKOCYTE ESTERASE URINE: NEGATIVE
LH SERPL-ACNC: 44.9 IU/L
LYMPHOCYTES # BLD AUTO: 1.28 K/UL
LYMPHOCYTES NFR BLD AUTO: 28 %
MAN DIFF?: NORMAL
MCHC RBC-ENTMCNC: 29.4 GM/DL
MCHC RBC-ENTMCNC: 30.6 PG
MCV RBC AUTO: 104.1 FL
MONOCYTES # BLD AUTO: 0.56 K/UL
MONOCYTES NFR BLD AUTO: 12.3 %
N GONORRHOEA RRNA SPEC QL NAA+PROBE: NOT DETECTED
NEUTROPHILS # BLD AUTO: 2.65 K/UL
NEUTROPHILS NFR BLD AUTO: 58 %
NITRITE URINE: NEGATIVE
NONHDLC SERPL-MCNC: 172 MG/DL
PH URINE: 7.5
PLATELET # BLD AUTO: 251 K/UL
POTASSIUM SERPL-SCNC: 5.2 MMOL/L
PROGEST SERPL-MCNC: 0.2 NG/ML
PROLACTIN SERPL-MCNC: 37.3 NG/ML
PROT SERPL-MCNC: 7.7 G/DL
PROTEIN URINE: NORMAL
RBC # BLD: 4.41 M/UL
RBC # FLD: 13 %
SODIUM SERPL-SCNC: 139 MMOL/L
SOURCE AMPLIFICATION: NORMAL
SPECIFIC GRAVITY URINE: 1.02
T PALLIDUM AB SER QL IA: NEGATIVE
TESTOST SERPL-MCNC: 35.2 NG/DL
TIBC SERPL-MCNC: 440 UG/DL
TRIGL SERPL-MCNC: 48 MG/DL
TSH SERPL-ACNC: 1.78 UIU/ML
UIBC SERPL-MCNC: 375 UG/DL
UROBILINOGEN URINE: NORMAL
VIT B12 SERPL-MCNC: 426 PG/ML
WBC # FLD AUTO: 4.57 K/UL

## 2021-12-09 NOTE — PHYSICAL EXAM
[No Acute Distress] : no acute distress [Well Nourished] : well nourished [Well Developed] : well developed [Well-Appearing] : well-appearing [Normal Sclera/Conjunctiva] : normal sclera/conjunctiva [PERRL] : pupils equal round and reactive to light [EOMI] : extraocular movements intact [Normal Outer Ear/Nose] : the outer ears and nose were normal in appearance [Normal TMs] : both tympanic membranes were normal [No JVD] : no jugular venous distention [No Lymphadenopathy] : no lymphadenopathy [Supple] : supple [Thyroid Normal, No Nodules] : the thyroid was normal and there were no nodules present [No Respiratory Distress] : no respiratory distress  [No Accessory Muscle Use] : no accessory muscle use [Clear to Auscultation] : lungs were clear to auscultation bilaterally [Normal Rate] : normal rate  [Regular Rhythm] : with a regular rhythm [Normal S1, S2] : normal S1 and S2 [No Murmur] : no murmur heard [No Abdominal Bruit] : a ~M bruit was not heard ~T in the abdomen [No Varicosities] : no varicosities [Pedal Pulses Present] : the pedal pulses are present [No Edema] : there was no peripheral edema [No Palpable Aorta] : no palpable aorta [No Extremity Clubbing/Cyanosis] : no extremity clubbing/cyanosis [Normal Appearance] : normal in appearance [No Nipple Discharge] : no nipple discharge [No Axillary Lymphadenopathy] : no axillary lymphadenopathy [Soft] : abdomen soft [Non Tender] : non-tender [Non-distended] : non-distended [No Masses] : no abdominal mass palpated [No HSM] : no HSM [Normal Bowel Sounds] : normal bowel sounds [Normal Posterior Cervical Nodes] : no posterior cervical lymphadenopathy [Normal Anterior Cervical Nodes] : no anterior cervical lymphadenopathy [No CVA Tenderness] : no CVA  tenderness [No Spinal Tenderness] : no spinal tenderness [No Joint Swelling] : no joint swelling [Grossly Normal Strength/Tone] : grossly normal strength/tone [No Rash] : no rash [Coordination Grossly Intact] : coordination grossly intact [No Focal Deficits] : no focal deficits [Normal Gait] : normal gait [Normal Affect] : the affect was normal [Alert and Oriented x3] : oriented to person, place, and time [Normal Insight/Judgement] : insight and judgment were intact [de-identified] : morbidly obese [de-identified] : pendulous [de-identified] : b/l trapezius firmness

## 2021-12-09 NOTE — HEALTH RISK ASSESSMENT
[Good] : ~his/her~  mood as  good [Yes] : Yes [Monthly or less (1 pt)] : Monthly or less (1 point) [1 or 2 (0 pts)] : 1 or 2 (0 points) [Never (0 pts)] : Never (0 points) [No] : In the past 12 months have you used drugs other than those required for medical reasons? No [With Family] : lives with family [# of Members in Household ___] :  household currently consist of [unfilled] member(s) [Unemployed] : unemployed [] :  [# Of Children ___] : has [unfilled] children [Sexually Active] : sexually active [Feels Safe at Home] : Feels safe at home [Fully functional (bathing, dressing, toileting, transferring, walking, feeding)] : Fully functional (bathing, dressing, toileting, transferring, walking, feeding) [Fully functional (using the telephone, shopping, preparing meals, housekeeping, doing laundry, using] : Fully functional and needs no help or supervision to perform IADLs (using the telephone, shopping, preparing meals, housekeeping, doing laundry, using transportation, managing medications and managing finances) [Smoke Detector] : smoke detector [] : No [de-identified] : endo, cardio, pulm [Change in mental status noted] : No change in mental status noted [Language] : denies difficulty with language [Handling Complex Tasks] : denies difficulty handling complex tasks [Reports changes in hearing] : Reports no changes in hearing [Reports changes in vision] : Reports no changes in vision [Reports changes in dental health] : Reports no changes in dental health [de-identified] : mom, , niece, nephew, sister, dgtr [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

## 2021-12-09 NOTE — HISTORY OF PRESENT ILLNESS
[FreeTextEntry1] : 46 year old female who presents today for a complete physical exam.\par sees pulm, endo, neph, cardio (non [facility])\par moved from [facility] in feb 2021\par c/o b/l leg swelling, had sono yesterday at cardio, hx of having done echo & stress test, nml per pt\par c/o irregular periods [de-identified] : covid vx jul 2021

## 2021-12-09 NOTE — PLAN
[FreeTextEntry1] : chk bw\par cont f/u w/ specialists\par wgt loss encouraged\par rtc in 1 mth to discuss options

## 2021-12-12 LAB — ESTROGEN SERPL-MCNC: 563 PG/ML

## 2021-12-14 ENCOUNTER — OUTPATIENT (OUTPATIENT)
Dept: OUTPATIENT SERVICES | Facility: HOSPITAL | Age: 46
LOS: 1 days | End: 2021-12-14
Payer: COMMERCIAL

## 2021-12-14 ENCOUNTER — RESULT REVIEW (OUTPATIENT)
Age: 46
End: 2021-12-14

## 2021-12-14 ENCOUNTER — APPOINTMENT (OUTPATIENT)
Dept: MAMMOGRAPHY | Facility: IMAGING CENTER | Age: 46
End: 2021-12-14
Payer: COMMERCIAL

## 2021-12-14 DIAGNOSIS — Z98.891 HISTORY OF UTERINE SCAR FROM PREVIOUS SURGERY: Chronic | ICD-10-CM

## 2021-12-14 DIAGNOSIS — Z00.8 ENCOUNTER FOR OTHER GENERAL EXAMINATION: ICD-10-CM

## 2021-12-14 DIAGNOSIS — Z98.890 OTHER SPECIFIED POSTPROCEDURAL STATES: Chronic | ICD-10-CM

## 2021-12-14 PROCEDURE — 77063 BREAST TOMOSYNTHESIS BI: CPT

## 2021-12-14 PROCEDURE — 77063 BREAST TOMOSYNTHESIS BI: CPT | Mod: 26

## 2021-12-14 PROCEDURE — 77067 SCR MAMMO BI INCL CAD: CPT

## 2021-12-14 PROCEDURE — 77067 SCR MAMMO BI INCL CAD: CPT | Mod: 26

## 2021-12-16 LAB — DHEA-SULFATE, SERUM: 91 UG/DL

## 2022-01-04 ENCOUNTER — NON-APPOINTMENT (OUTPATIENT)
Age: 47
End: 2022-01-04

## 2022-01-12 ENCOUNTER — APPOINTMENT (OUTPATIENT)
Dept: FAMILY MEDICINE | Facility: CLINIC | Age: 47
End: 2022-01-12

## 2022-06-06 ENCOUNTER — APPOINTMENT (OUTPATIENT)
Dept: ENDOCRINOLOGY | Facility: CLINIC | Age: 47
End: 2022-06-06

## 2022-07-12 NOTE — ED PROVIDER NOTE - DATE/TIME 2
Basal + ISS  She's on steroids and her dtr stated that the last time she was on steroids she glu readings > 800.   Will monitor adjust based on trends  A1C 8.0 (7/8)  - Steroids discontinued  - AG elevated  - Added prandial insulin  - Episodes of hypoglycemia over night 7/10. Insulin therapy hold for now. Will adjust as needed     15-Mar-2021 13:17

## 2022-07-27 ENCOUNTER — APPOINTMENT (OUTPATIENT)
Dept: PULMONOLOGY | Facility: CLINIC | Age: 47
End: 2022-07-27

## 2022-08-29 ENCOUNTER — RX RENEWAL (OUTPATIENT)
Age: 47
End: 2022-08-29

## 2022-09-07 ENCOUNTER — APPOINTMENT (OUTPATIENT)
Dept: FAMILY MEDICINE | Facility: CLINIC | Age: 47
End: 2022-09-07

## 2022-09-09 ENCOUNTER — APPOINTMENT (OUTPATIENT)
Dept: PULMONOLOGY | Facility: CLINIC | Age: 47
End: 2022-09-09

## 2022-10-02 ENCOUNTER — RX RENEWAL (OUTPATIENT)
Age: 47
End: 2022-10-02

## 2022-11-03 ENCOUNTER — APPOINTMENT (OUTPATIENT)
Dept: FAMILY MEDICINE | Facility: CLINIC | Age: 47
End: 2022-11-03

## 2022-11-03 VITALS
TEMPERATURE: 98.3 F | HEART RATE: 101 BPM | SYSTOLIC BLOOD PRESSURE: 130 MMHG | BODY MASS INDEX: 43.9 KG/M2 | HEIGHT: 68.5 IN | DIASTOLIC BLOOD PRESSURE: 86 MMHG | WEIGHT: 293 LBS | OXYGEN SATURATION: 93 %

## 2022-11-03 DIAGNOSIS — R25.1 TREMOR, UNSPECIFIED: ICD-10-CM

## 2022-11-03 DIAGNOSIS — R79.89 OTHER SPECIFIED ABNORMAL FINDINGS OF BLOOD CHEMISTRY: ICD-10-CM

## 2022-11-03 DIAGNOSIS — Z01.818 ENCOUNTER FOR OTHER PREPROCEDURAL EXAMINATION: ICD-10-CM

## 2022-11-03 PROCEDURE — 99214 OFFICE O/P EST MOD 30 MIN: CPT | Mod: 25

## 2022-11-03 PROCEDURE — 90686 IIV4 VACC NO PRSV 0.5 ML IM: CPT

## 2022-11-03 PROCEDURE — G0008: CPT

## 2022-11-03 RX ORDER — FERROUS SULFATE 325(65) MG
325 (65 FE) TABLET ORAL 3 TIMES DAILY
Qty: 270 | Refills: 2 | Status: DISCONTINUED | COMMUNITY
Start: 2021-04-21 | End: 2022-11-03

## 2022-11-03 NOTE — PLAN
[FreeTextEntry1] : rtc for cpe\par reduce salt/fats, exercise\par deferring wgt loss meds at this time

## 2022-11-03 NOTE — HISTORY OF PRESENT ILLNESS
[FreeTextEntry1] : gaining wgt, now over 300 lbs, admits to eating cheeseburgers, [employer] fries, ice cream, since dgtr does not finish her meals\par dgtr w/ autism, stresses over her and her lack of eating\par c/o involuntary hand shaking, states father and uncle had similar issues\par c/o lower leg swelling, sometimes relieved w/ compression stockings\par

## 2022-11-03 NOTE — PHYSICAL EXAM
[No Acute Distress] : no acute distress [Normal Sclera/Conjunctiva] : normal sclera/conjunctiva [EOMI] : extraocular movements intact [No JVD] : no jugular venous distention [Normal] : normal rate, regular rhythm, normal S1 and S2 and no murmur heard [Coordination Grossly Intact] : coordination grossly intact [Normal Affect] : the affect was normal [Alert and Oriented x3] : oriented to person, place, and time [Normal Insight/Judgement] : insight and judgment were intact [de-identified] : morbidly obese

## 2023-02-01 ENCOUNTER — APPOINTMENT (OUTPATIENT)
Dept: FAMILY MEDICINE | Facility: CLINIC | Age: 48
End: 2023-02-01

## 2023-02-02 ENCOUNTER — RX RENEWAL (OUTPATIENT)
Age: 48
End: 2023-02-02

## 2023-06-02 ENCOUNTER — APPOINTMENT (OUTPATIENT)
Dept: FAMILY MEDICINE | Facility: CLINIC | Age: 48
End: 2023-06-02
Payer: MEDICAID

## 2023-06-02 VITALS
WEIGHT: 293 LBS | HEIGHT: 70 IN | BODY MASS INDEX: 41.95 KG/M2 | OXYGEN SATURATION: 96 % | DIASTOLIC BLOOD PRESSURE: 80 MMHG | HEART RATE: 111 BPM | SYSTOLIC BLOOD PRESSURE: 145 MMHG

## 2023-06-02 PROCEDURE — 99214 OFFICE O/P EST MOD 30 MIN: CPT | Mod: 25

## 2023-06-02 PROCEDURE — 36415 COLL VENOUS BLD VENIPUNCTURE: CPT

## 2023-06-02 RX ORDER — ERGOCALCIFEROL 1.25 MG/1
1.25 MG CAPSULE, LIQUID FILLED ORAL
Qty: 24 | Refills: 3 | Status: ACTIVE | COMMUNITY
Start: 2021-07-15 | End: 1900-01-01

## 2023-06-02 NOTE — HISTORY OF PRESENT ILLNESS
[FreeTextEntry1] : here for follow up\par c/o wgt gain, eating fast food particularly chinese food a few times a wk, not exercising\par c/o knee pain and increased swelling to her lower legs\par now feeling uncomfortable getting up from the chair due to her wgt\par also needs bw and med refills

## 2023-06-02 NOTE — PHYSICAL EXAM
[No Acute Distress] : no acute distress [Normal Sclera/Conjunctiva] : normal sclera/conjunctiva [Normal Outer Ear/Nose] : the outer ears and nose were normal in appearance [Normal] : normal rate, regular rhythm, normal S1 and S2 and no murmur heard [Coordination Grossly Intact] : coordination grossly intact [Normal Affect] : the affect was normal [Alert and Oriented x3] : oriented to person, place, and time [Normal Insight/Judgement] : insight and judgment were intact [de-identified] : morbidly obese [de-identified] : protuberant

## 2023-06-06 LAB
25(OH)D3 SERPL-MCNC: 33.3 NG/ML
ALBUMIN SERPL ELPH-MCNC: 4.5 G/DL
ALP BLD-CCNC: 74 U/L
ALT SERPL-CCNC: 16 U/L
ANION GAP SERPL CALC-SCNC: 9 MMOL/L
AST SERPL-CCNC: 20 U/L
BILIRUB SERPL-MCNC: 0.4 MG/DL
BUN SERPL-MCNC: 12 MG/DL
CALCIUM SERPL-MCNC: 10 MG/DL
CHLORIDE SERPL-SCNC: 102 MMOL/L
CHOLEST SERPL-MCNC: 197 MG/DL
CO2 SERPL-SCNC: 32 MMOL/L
CREAT SERPL-MCNC: 0.74 MG/DL
EGFR: 100 ML/MIN/1.73M2
ESTIMATED AVERAGE GLUCOSE: 108 MG/DL
GLUCOSE SERPL-MCNC: 112 MG/DL
HBA1C MFR BLD HPLC: 5.4 %
HDLC SERPL-MCNC: 64 MG/DL
LDLC SERPL CALC-MCNC: 119 MG/DL
NONHDLC SERPL-MCNC: 133 MG/DL
POTASSIUM SERPL-SCNC: 4.4 MMOL/L
PROT SERPL-MCNC: 7.5 G/DL
SODIUM SERPL-SCNC: 142 MMOL/L
TRIGL SERPL-MCNC: 72 MG/DL

## 2023-06-19 ENCOUNTER — APPOINTMENT (OUTPATIENT)
Dept: CARDIOLOGY | Facility: CLINIC | Age: 48
End: 2023-06-19

## 2023-06-21 ENCOUNTER — APPOINTMENT (OUTPATIENT)
Dept: ENDOCRINOLOGY | Facility: CLINIC | Age: 48
End: 2023-06-21
Payer: MEDICAID

## 2023-06-21 VITALS — BODY MASS INDEX: 41.95 KG/M2 | WEIGHT: 293 LBS | HEIGHT: 70 IN

## 2023-06-21 PROCEDURE — 97802 MEDICAL NUTRITION INDIV IN: CPT

## 2023-07-28 NOTE — DIETITIAN INITIAL EVALUATION ADULT. - NUTRITIONGOAL OUTCOME1
minimum assist (75% patients effort) Pt will gradually lose weight towards IBW, verbalize understanding of education, teach back points

## 2023-09-27 ENCOUNTER — APPOINTMENT (OUTPATIENT)
Dept: FAMILY MEDICINE | Facility: CLINIC | Age: 48
End: 2023-09-27
Payer: MEDICAID

## 2023-09-27 VITALS
HEIGHT: 70 IN | RESPIRATION RATE: 18 BRPM | OXYGEN SATURATION: 96 % | TEMPERATURE: 97 F | WEIGHT: 293 LBS | SYSTOLIC BLOOD PRESSURE: 145 MMHG | BODY MASS INDEX: 41.95 KG/M2 | HEART RATE: 100 BPM | DIASTOLIC BLOOD PRESSURE: 88 MMHG

## 2023-09-27 DIAGNOSIS — M25.562 PAIN IN LEFT KNEE: ICD-10-CM

## 2023-09-27 DIAGNOSIS — E55.9 VITAMIN D DEFICIENCY, UNSPECIFIED: ICD-10-CM

## 2023-09-27 DIAGNOSIS — Z23 ENCOUNTER FOR IMMUNIZATION: ICD-10-CM

## 2023-09-27 PROCEDURE — G0008: CPT

## 2023-09-27 PROCEDURE — 90686 IIV4 VACC NO PRSV 0.5 ML IM: CPT

## 2023-09-27 PROCEDURE — 99214 OFFICE O/P EST MOD 30 MIN: CPT | Mod: 25

## 2023-11-29 ENCOUNTER — RESULT REVIEW (OUTPATIENT)
Age: 48
End: 2023-11-29

## 2023-11-29 ENCOUNTER — APPOINTMENT (OUTPATIENT)
Dept: MAMMOGRAPHY | Facility: IMAGING CENTER | Age: 48
End: 2023-11-29
Payer: COMMERCIAL

## 2023-11-29 ENCOUNTER — OUTPATIENT (OUTPATIENT)
Dept: OUTPATIENT SERVICES | Facility: HOSPITAL | Age: 48
LOS: 1 days | End: 2023-11-29
Payer: COMMERCIAL

## 2023-11-29 DIAGNOSIS — Z98.890 OTHER SPECIFIED POSTPROCEDURAL STATES: Chronic | ICD-10-CM

## 2023-11-29 DIAGNOSIS — Z98.891 HISTORY OF UTERINE SCAR FROM PREVIOUS SURGERY: Chronic | ICD-10-CM

## 2023-11-29 DIAGNOSIS — Z00.8 ENCOUNTER FOR OTHER GENERAL EXAMINATION: ICD-10-CM

## 2023-11-29 PROCEDURE — 77067 SCR MAMMO BI INCL CAD: CPT

## 2023-11-29 PROCEDURE — 77063 BREAST TOMOSYNTHESIS BI: CPT

## 2023-11-29 PROCEDURE — 77067 SCR MAMMO BI INCL CAD: CPT | Mod: 26

## 2023-11-29 PROCEDURE — 77063 BREAST TOMOSYNTHESIS BI: CPT | Mod: 26

## 2023-12-05 ENCOUNTER — APPOINTMENT (OUTPATIENT)
Dept: FAMILY MEDICINE | Facility: CLINIC | Age: 48
End: 2023-12-05

## 2024-01-12 ENCOUNTER — APPOINTMENT (OUTPATIENT)
Dept: FAMILY MEDICINE | Facility: CLINIC | Age: 49
End: 2024-01-12

## 2024-04-10 ENCOUNTER — RX RENEWAL (OUTPATIENT)
Age: 49
End: 2024-04-10

## 2024-04-29 ENCOUNTER — RX RENEWAL (OUTPATIENT)
Age: 49
End: 2024-04-29

## 2024-04-29 ENCOUNTER — TRANSCRIPTION ENCOUNTER (OUTPATIENT)
Age: 49
End: 2024-04-29

## 2024-04-29 RX ORDER — PRAVASTATIN SODIUM 10 MG/1
10 TABLET ORAL
Qty: 90 | Refills: 0 | Status: ACTIVE | COMMUNITY
Start: 2021-12-13 | End: 1900-01-01

## 2024-05-30 ENCOUNTER — APPOINTMENT (OUTPATIENT)
Dept: FAMILY MEDICINE | Facility: CLINIC | Age: 49
End: 2024-05-30
Payer: COMMERCIAL

## 2024-05-30 VITALS
SYSTOLIC BLOOD PRESSURE: 138 MMHG | HEART RATE: 100 BPM | WEIGHT: 293 LBS | BODY MASS INDEX: 41.95 KG/M2 | OXYGEN SATURATION: 100 % | RESPIRATION RATE: 18 BRPM | DIASTOLIC BLOOD PRESSURE: 86 MMHG | TEMPERATURE: 98.4 F | HEIGHT: 70 IN

## 2024-05-30 DIAGNOSIS — E66.01 MORBID (SEVERE) OBESITY DUE TO EXCESS CALORIES: ICD-10-CM

## 2024-05-30 DIAGNOSIS — E78.5 HYPERLIPIDEMIA, UNSPECIFIED: ICD-10-CM

## 2024-05-30 DIAGNOSIS — Z71.3 DIETARY COUNSELING AND SURVEILLANCE: ICD-10-CM

## 2024-05-30 DIAGNOSIS — E66.9 OBESITY, UNSPECIFIED: ICD-10-CM

## 2024-05-30 DIAGNOSIS — R60.0 LOCALIZED EDEMA: ICD-10-CM

## 2024-05-30 DIAGNOSIS — G47.33 OBSTRUCTIVE SLEEP APNEA (ADULT) (PEDIATRIC): ICD-10-CM

## 2024-05-30 DIAGNOSIS — I10 ESSENTIAL (PRIMARY) HYPERTENSION: ICD-10-CM

## 2024-05-30 PROCEDURE — 99214 OFFICE O/P EST MOD 30 MIN: CPT

## 2024-05-30 RX ORDER — HYDROCHLOROTHIAZIDE 25 MG/1
25 TABLET ORAL
Qty: 90 | Refills: 1 | Status: ACTIVE | COMMUNITY
Start: 2024-05-30 | End: 1900-01-01

## 2024-05-30 RX ORDER — SEMAGLUTIDE 0.68 MG/ML
2 INJECTION, SOLUTION SUBCUTANEOUS
Qty: 1 | Refills: 3 | Status: DISCONTINUED | COMMUNITY
Start: 2023-09-27 | End: 2024-05-30

## 2024-05-30 RX ORDER — MELOXICAM 15 MG/1
15 TABLET ORAL DAILY
Qty: 20 | Refills: 0 | Status: DISCONTINUED | COMMUNITY
Start: 2023-09-27 | End: 2024-05-30

## 2024-05-30 NOTE — HISTORY OF PRESENT ILLNESS
[FreeTextEntry1] : here for follow up c/o increased leg swelling for several weeks gaining weight c/o low oxygen levels at home, not breathing well, has followed up with pulmonologist in the past, advised to lose weight

## 2024-05-30 NOTE — PHYSICAL EXAM
[No Acute Distress] : no acute distress [Normal Sclera/Conjunctiva] : normal sclera/conjunctiva [EOMI] : extraocular movements intact [Normal Outer Ear/Nose] : the outer ears and nose were normal in appearance [No JVD] : no jugular venous distention [Normal] : normal rate, regular rhythm, normal S1 and S2 and no murmur heard [No Extremity Clubbing/Cyanosis] : no extremity clubbing/cyanosis [Coordination Grossly Intact] : coordination grossly intact [Normal Affect] : the affect was normal [Alert and Oriented x3] : oriented to person, place, and time [Normal Insight/Judgement] : insight and judgment were intact [de-identified] : morbidly obese [de-identified] : b/l +3 pitting edema

## 2024-06-24 ENCOUNTER — APPOINTMENT (OUTPATIENT)
Dept: VASCULAR SURGERY | Facility: CLINIC | Age: 49
End: 2024-06-24
Payer: COMMERCIAL

## 2024-06-24 VITALS
BODY MASS INDEX: 41.95 KG/M2 | WEIGHT: 293 LBS | SYSTOLIC BLOOD PRESSURE: 148 MMHG | OXYGEN SATURATION: 96 % | HEART RATE: 97 BPM | DIASTOLIC BLOOD PRESSURE: 94 MMHG | HEIGHT: 70 IN

## 2024-06-24 PROCEDURE — 99203 OFFICE O/P NEW LOW 30 MIN: CPT

## 2024-07-08 ENCOUNTER — RX RENEWAL (OUTPATIENT)
Age: 49
End: 2024-07-08

## 2024-07-30 ENCOUNTER — APPOINTMENT (OUTPATIENT)
Dept: FAMILY MEDICINE | Facility: CLINIC | Age: 49
End: 2024-07-30
Payer: COMMERCIAL

## 2024-07-30 VITALS
RESPIRATION RATE: 18 BRPM | HEIGHT: 70 IN | OXYGEN SATURATION: 96 % | WEIGHT: 293 LBS | BODY MASS INDEX: 41.95 KG/M2 | DIASTOLIC BLOOD PRESSURE: 86 MMHG | TEMPERATURE: 98.2 F | HEART RATE: 66 BPM | SYSTOLIC BLOOD PRESSURE: 136 MMHG

## 2024-07-30 DIAGNOSIS — R79.89 OTHER SPECIFIED ABNORMAL FINDINGS OF BLOOD CHEMISTRY: ICD-10-CM

## 2024-07-30 DIAGNOSIS — I51.7 CARDIOMEGALY: ICD-10-CM

## 2024-07-30 DIAGNOSIS — I10 ESSENTIAL (PRIMARY) HYPERTENSION: ICD-10-CM

## 2024-07-30 DIAGNOSIS — F43.0 ACUTE STRESS REACTION: ICD-10-CM

## 2024-07-30 DIAGNOSIS — R60.0 LOCALIZED EDEMA: ICD-10-CM

## 2024-07-30 DIAGNOSIS — I87.2 VENOUS INSUFFICIENCY (CHRONIC) (PERIPHERAL): ICD-10-CM

## 2024-07-30 DIAGNOSIS — E66.01 MORBID (SEVERE) OBESITY DUE TO EXCESS CALORIES: ICD-10-CM

## 2024-07-30 DIAGNOSIS — D50.9 IRON DEFICIENCY ANEMIA, UNSPECIFIED: ICD-10-CM

## 2024-07-30 DIAGNOSIS — E78.5 HYPERLIPIDEMIA, UNSPECIFIED: ICD-10-CM

## 2024-07-30 DIAGNOSIS — G47.33 OBSTRUCTIVE SLEEP APNEA (ADULT) (PEDIATRIC): ICD-10-CM

## 2024-07-30 DIAGNOSIS — Z71.3 DIETARY COUNSELING AND SURVEILLANCE: ICD-10-CM

## 2024-07-30 DIAGNOSIS — F43.23 ADJUSTMENT DISORDER WITH MIXED ANXIETY AND DEPRESSED MOOD: ICD-10-CM

## 2024-07-30 DIAGNOSIS — D49.7 NEOPLASM OF UNSPECIFIED BEHAVIOR OF ENDOCRINE GLANDS AND OTHER PARTS OF NERVOUS SYSTEM: ICD-10-CM

## 2024-07-30 DIAGNOSIS — Z00.00 ENCOUNTER FOR GENERAL ADULT MEDICAL EXAMINATION W/OUT ABNORMAL FINDINGS: ICD-10-CM

## 2024-07-30 DIAGNOSIS — E55.9 VITAMIN D DEFICIENCY, UNSPECIFIED: ICD-10-CM

## 2024-07-30 PROCEDURE — 99214 OFFICE O/P EST MOD 30 MIN: CPT | Mod: 25

## 2024-07-30 PROCEDURE — 99396 PREV VISIT EST AGE 40-64: CPT

## 2024-07-30 NOTE — HEALTH RISK ASSESSMENT
[Good] : ~his/her~  mood as  good [Yes] : Yes [Monthly or less (1 pt)] : Monthly or less (1 point) [1 or 2 (0 pts)] : 1 or 2 (0 points) [Never (0 pts)] : Never (0 points) [No] : In the past 12 months have you used drugs other than those required for medical reasons? No [Never] : Never [NO] : No [HIV test declined] : HIV test declined [Hepatitis C test declined] : Hepatitis C test declined [With Family] : lives with family [# of Members in Household ___] :  household currently consist of [unfilled] member(s) [Unemployed] : unemployed [] :  [# Of Children ___] : has [unfilled] children [Feels Safe at Home] : Feels safe at home [Fully functional (bathing, dressing, toileting, transferring, walking, feeding)] : Fully functional (bathing, dressing, toileting, transferring, walking, feeding) [Fully functional (using the telephone, shopping, preparing meals, housekeeping, doing laundry, using] : Fully functional and needs no help or supervision to perform IADLs (using the telephone, shopping, preparing meals, housekeeping, doing laundry, using transportation, managing medications and managing finances) [0] : 2) Feeling down, depressed, or hopeless: Not at all (0) [de-identified] : no exercise [de-identified] : trying [Change in mental status noted] : No change in mental status noted [Language] : denies difficulty with language [Handling Complex Tasks] : denies difficulty handling complex tasks [Sexually Active] : not sexually active [Reports changes in hearing] : Reports no changes in hearing [Reports changes in vision] : Reports no changes in vision [Reports changes in dental health] : Reports no changes in dental health [de-identified] : , mother, dgt, sister, 1 niece and nephew

## 2024-07-30 NOTE — HISTORY OF PRESENT ILLNESS
[FreeTextEntry1] : 49 year old female who presents today for a complete physical exam w/ various medical concerns. 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

## 2024-07-30 NOTE — PHYSICAL EXAM
[No Acute Distress] : no acute distress [Well-Appearing] : well-appearing [Normal Sclera/Conjunctiva] : normal sclera/conjunctiva [PERRL] : pupils equal round and reactive to light [EOMI] : extraocular movements intact [Normal Outer Ear/Nose] : the outer ears and nose were normal in appearance [Normal Oropharynx] : the oropharynx was normal [Normal TMs] : both tympanic membranes were normal [No JVD] : no jugular venous distention [No Lymphadenopathy] : no lymphadenopathy [Supple] : supple [Thyroid Normal, No Nodules] : the thyroid was normal and there were no nodules present [No Respiratory Distress] : no respiratory distress  [No Accessory Muscle Use] : no accessory muscle use [Clear to Auscultation] : lungs were clear to auscultation bilaterally [Normal Rate] : normal rate  [Regular Rhythm] : with a regular rhythm [Normal S1, S2] : normal S1 and S2 [No Murmur] : no murmur heard [No Carotid Bruits] : no carotid bruits [No Abdominal Bruit] : a ~M bruit was not heard ~T in the abdomen [No Varicosities] : no varicosities [Pedal Pulses Present] : the pedal pulses are present [No Palpable Aorta] : no palpable aorta [No Extremity Clubbing/Cyanosis] : no extremity clubbing/cyanosis [Normal Appearance] : normal in appearance [No Nipple Discharge] : no nipple discharge [No Axillary Lymphadenopathy] : no axillary lymphadenopathy [Soft] : abdomen soft [Non Tender] : non-tender [Non-distended] : non-distended [No Masses] : no abdominal mass palpated [No HSM] : no HSM [Normal Bowel Sounds] : normal bowel sounds [Normal Supraclavicular Nodes] : no supraclavicular lymphadenopathy [Normal Axillary Nodes] : no axillary lymphadenopathy [Normal Posterior Cervical Nodes] : no posterior cervical lymphadenopathy [Normal Anterior Cervical Nodes] : no anterior cervical lymphadenopathy [No CVA Tenderness] : no CVA  tenderness [No Spinal Tenderness] : no spinal tenderness [No Joint Swelling] : no joint swelling [Grossly Normal Strength/Tone] : grossly normal strength/tone [No Rash] : no rash [Coordination Grossly Intact] : coordination grossly intact [No Focal Deficits] : no focal deficits [Normal Gait] : normal gait [Normal Affect] : the affect was normal [Alert and Oriented x3] : oriented to person, place, and time [Normal Insight/Judgement] : insight and judgment were intact [de-identified] : morbidly obese [de-identified] : pendulous [de-identified] : tense trapexius

## 2024-07-30 NOTE — HEALTH RISK ASSESSMENT
[Good] : ~his/her~  mood as  good [Yes] : Yes [Monthly or less (1 pt)] : Monthly or less (1 point) [1 or 2 (0 pts)] : 1 or 2 (0 points) [Never (0 pts)] : Never (0 points) [No] : In the past 12 months have you used drugs other than those required for medical reasons? No [Never] : Never [NO] : No [HIV test declined] : HIV test declined [Hepatitis C test declined] : Hepatitis C test declined [With Family] : lives with family [# of Members in Household ___] :  household currently consist of [unfilled] member(s) [Unemployed] : unemployed [] :  [# Of Children ___] : has [unfilled] children [Feels Safe at Home] : Feels safe at home [Fully functional (bathing, dressing, toileting, transferring, walking, feeding)] : Fully functional (bathing, dressing, toileting, transferring, walking, feeding) [Fully functional (using the telephone, shopping, preparing meals, housekeeping, doing laundry, using] : Fully functional and needs no help or supervision to perform IADLs (using the telephone, shopping, preparing meals, housekeeping, doing laundry, using transportation, managing medications and managing finances) [0] : 2) Feeling down, depressed, or hopeless: Not at all (0) [de-identified] : no exercise [de-identified] : trying [Change in mental status noted] : No change in mental status noted [Language] : denies difficulty with language [Handling Complex Tasks] : denies difficulty handling complex tasks [Sexually Active] : not sexually active [Reports changes in hearing] : Reports no changes in hearing [Reports changes in vision] : Reports no changes in vision [Reports changes in dental health] : Reports no changes in dental health [de-identified] : , mother, dgt, sister, 1 niece and nephew

## 2024-07-30 NOTE — PLAN
[FreeTextEntry1] : enforced f/u w/ ophthalmology/dentist/GI/GYN chk bw fasting at lab increase physical activity healthy eating ozempic obtain parathyroid sono results f/u w/ endocrinology

## 2024-07-30 NOTE — PHYSICAL EXAM
[No Acute Distress] : no acute distress [Well-Appearing] : well-appearing [Normal Sclera/Conjunctiva] : normal sclera/conjunctiva [PERRL] : pupils equal round and reactive to light [EOMI] : extraocular movements intact [Normal Outer Ear/Nose] : the outer ears and nose were normal in appearance [Normal Oropharynx] : the oropharynx was normal [Normal TMs] : both tympanic membranes were normal [No JVD] : no jugular venous distention [No Lymphadenopathy] : no lymphadenopathy [Supple] : supple [Thyroid Normal, No Nodules] : the thyroid was normal and there were no nodules present [No Respiratory Distress] : no respiratory distress  [No Accessory Muscle Use] : no accessory muscle use [Clear to Auscultation] : lungs were clear to auscultation bilaterally [Normal Rate] : normal rate  [Regular Rhythm] : with a regular rhythm [Normal S1, S2] : normal S1 and S2 [No Murmur] : no murmur heard [No Carotid Bruits] : no carotid bruits [No Abdominal Bruit] : a ~M bruit was not heard ~T in the abdomen [No Varicosities] : no varicosities [Pedal Pulses Present] : the pedal pulses are present [No Palpable Aorta] : no palpable aorta [No Extremity Clubbing/Cyanosis] : no extremity clubbing/cyanosis [Normal Appearance] : normal in appearance [No Nipple Discharge] : no nipple discharge [No Axillary Lymphadenopathy] : no axillary lymphadenopathy [Soft] : abdomen soft [Non Tender] : non-tender [Non-distended] : non-distended [No Masses] : no abdominal mass palpated [No HSM] : no HSM [Normal Bowel Sounds] : normal bowel sounds [Normal Supraclavicular Nodes] : no supraclavicular lymphadenopathy [Normal Axillary Nodes] : no axillary lymphadenopathy [Normal Posterior Cervical Nodes] : no posterior cervical lymphadenopathy [Normal Anterior Cervical Nodes] : no anterior cervical lymphadenopathy [No CVA Tenderness] : no CVA  tenderness [No Spinal Tenderness] : no spinal tenderness [No Joint Swelling] : no joint swelling [Grossly Normal Strength/Tone] : grossly normal strength/tone [No Rash] : no rash [Coordination Grossly Intact] : coordination grossly intact [No Focal Deficits] : no focal deficits [Normal Gait] : normal gait [Normal Affect] : the affect was normal [Alert and Oriented x3] : oriented to person, place, and time [Normal Insight/Judgement] : insight and judgment were intact [de-identified] : morbidly obese [de-identified] : pendulous [de-identified] : tense trapexius

## 2024-08-05 ENCOUNTER — APPOINTMENT (OUTPATIENT)
Dept: VASCULAR SURGERY | Facility: CLINIC | Age: 49
End: 2024-08-05

## 2024-08-05 PROCEDURE — 99213 OFFICE O/P EST LOW 20 MIN: CPT

## 2024-08-05 PROCEDURE — 93970 EXTREMITY STUDY: CPT

## 2024-08-07 RX ORDER — SEMAGLUTIDE 0.25 MG/.5ML
0.25 INJECTION, SOLUTION SUBCUTANEOUS
Qty: 1 | Refills: 1 | Status: DISCONTINUED | COMMUNITY
Start: 2024-07-30 | End: 2024-08-07

## 2024-08-07 RX ORDER — SEMAGLUTIDE 0.68 MG/ML
2 INJECTION, SOLUTION SUBCUTANEOUS
Qty: 1 | Refills: 3 | Status: DISCONTINUED | COMMUNITY
Start: 2024-07-30 | End: 2024-08-07

## 2024-08-07 RX ORDER — TIRZEPATIDE 2.5 MG/.5ML
2.5 INJECTION, SOLUTION SUBCUTANEOUS
Qty: 1 | Refills: 1 | Status: DISCONTINUED | COMMUNITY
Start: 2024-07-30 | End: 2024-08-07

## 2024-08-07 NOTE — CONSULT LETTER
[Dear  ___] : Dear ~DORIAN, [Consult Letter:] : I had the pleasure of evaluating your patient, [unfilled]. [Please see my note below.] : Please see my note below. [Consult Closing:] : Thank you very much for allowing me to participate in the care of this patient.  If you have any questions, please do not hesitate to contact me. [Sincerely,] : Sincerely, [FreeTextEntry1] : She presented to me for evaluation of painful bilateral lower extremity varicose veins.  On exam, she had palpable lower extremity pulses with bilateral lower extremity varicose veins and ankle swelling.  Venous duplex shows no evidence of deep venous thrombosis or superficial venous thrombophlebitis.  There is evidence of bilateral lower extremity venous insufficiency with large varicose veins.  We discussed management options and will trial medical therapy with the use of compression stockings, leg elevation, and ambulation at this time.  I plan to see her again in 3 months for follow-up. [FreeTextEntry3] :     Teresa Leija MD, FACS, RPVI Division of Vascular & Endovascular Surgery [facility], Department of Surgery

## 2024-08-07 NOTE — PHYSICAL EXAM
[2+] : left 2+ [Ankle Swelling (On Exam)] : present [Ankle Swelling Bilaterally] : severe [Varicose Veins Of Lower Extremities] : bilaterally [] : bilaterally [Ankle Swelling On The Right] : mild [Calm] : calm [de-identified] : Well-appearing  [de-identified] : EOMI, anicteric  [de-identified] : soft, nt, nd  [de-identified] : moves all extremities  [de-identified] : no wounds on bilateral feet [de-identified] : A&Ox4

## 2024-08-07 NOTE — ASSESSMENT
[FreeTextEntry1] : EVERARDO HENRIQUEZ is a 49 year old female presents for evaluation.   > Venous insufficiency, CEAP C3 - Reviewed results of duplex with patient.  There is evidence of significant bilateral lower extremity venous insufficiency. - Discussed management options including medical therapy and intervention. Will trial medical therapy at this time.  - For symptom management, recommend use of compression stockings (20-30 mmhg, prescription given), leg elevation, and ambulation. - Follow up in 3 months.

## 2024-08-07 NOTE — DATA REVIEWED
[FreeTextEntry1] : /5/2024-bilateral lower extremity venous duplex Negative for DVT or SVT bilaterally. Right-reflux is noted in the GSV measuring 8.4 mm at the junction and 5.9 mm proximally, reflux greater than 0.5 seconds down to the calf.  Large varicose veins measuring up to 5.4 mm in size. Left-reflux noted in the CFV, SFJ and a GSV measuring 6.9 mm at the junction, 6.3 mm proximally, reflux greater than 0.5 seconds.  There are varicose veins measuring up to 4.4 mm in size.

## 2024-08-07 NOTE — PHYSICAL EXAM
[2+] : left 2+ [Ankle Swelling (On Exam)] : present [Ankle Swelling Bilaterally] : severe [Varicose Veins Of Lower Extremities] : bilaterally [] : bilaterally [Ankle Swelling On The Right] : mild [Calm] : calm [de-identified] : Well-appearing  [de-identified] : EOMI, anicteric  [de-identified] : soft, nt, nd  [de-identified] : moves all extremities  [de-identified] : no wounds on bilateral feet [de-identified] : A&Ox4

## 2024-08-07 NOTE — HISTORY OF PRESENT ILLNESS
[FreeTextEntry1] : EVERARDO HENRIQUEZ is a 49 year old female who presents for evaluation of leg swelling.   Referred by Nereida Liu NP.   Patient states that she has had bilateral leg/ankle swelling for over a year. The swelling is intermittent but worsened in the recent past. Recently started on HCTZ by primary, which helps with the swelling. The left leg is more swollen than the right. She also notes that her varicose veins have worsened as well.  Reports some leg tightness. Occasional leg cramps. Legs described as feeling sensitive, requiring massage to relieve her discomfort.  Personal history of DVT - DVT (solean vein DVT, also postop after myomectomy, treated with ac for six months); saw hematology no underlying hypercoagulable state Family history of DVT - Mother had PE (postop), Sister had PE (postop) Family history of venous insufficiency or varicose veins - mother Current compression stocking usage - intermittent use with improvement in swelling Has 1 child Currently unemployed, used to work in clinical  so sitting job Of note, patient sees a pulmonologist for breathing condition where she was told she does not ventilate well. She uses a machine at night to sleep.   + PMH: h/o postop DVT, HLD, ARVIND  + PSH: myomectomy x2 + FH: as above + SH: never smoker, social etoh use, no illicit substance use  + Aller: NKDA  Primary is Nereida Liu NP.  [de-identified] : 8/5/2024 - Pt presents for follow up. Has been using compression stockings with some improvement in her leg symptoms but symptoms recur when she is not using compression. Continues to have sensitivity over the varicose veins and the entire lower legs.

## 2024-08-07 NOTE — CONSULT LETTER
[Dear  ___] : Dear ~DORIAN, [Consult Letter:] : I had the pleasure of evaluating your patient, [unfilled]. [Please see my note below.] : Please see my note below. [Consult Closing:] : Thank you very much for allowing me to participate in the care of this patient.  If you have any questions, please do not hesitate to contact me. [Sincerely,] : Sincerely, [FreeTextEntry1] : She presented to me for evaluation of painful bilateral lower extremity varicose veins.  On exam, she had palpable lower extremity pulses with bilateral lower extremity varicose veins and ankle swelling.  Venous duplex shows no evidence of deep venous thrombosis or superficial venous thrombophlebitis.  There is evidence of bilateral lower extremity venous insufficiency with large varicose veins.  We discussed management options and will trial medical therapy with the use of compression stockings, leg elevation, and ambulation at this time.  I plan to see her again in 3 months for follow-up. [FreeTextEntry3] :     Teresa Leija MD, FACS, RPVI Division of Vascular & Endovascular Surgery [facility], Department of Surgery

## 2024-08-07 NOTE — HISTORY OF PRESENT ILLNESS
[FreeTextEntry1] : EVERARDO HENRIQUEZ is a 49 year old female who presents for evaluation of leg swelling.   Referred by Nereida Liu NP.   Patient states that she has had bilateral leg/ankle swelling for over a year. The swelling is intermittent but worsened in the recent past. Recently started on HCTZ by primary, which helps with the swelling. The left leg is more swollen than the right. She also notes that her varicose veins have worsened as well.  Reports some leg tightness. Occasional leg cramps. Legs described as feeling sensitive, requiring massage to relieve her discomfort.  Personal history of DVT - DVT (solean vein DVT, also postop after myomectomy, treated with ac for six months); saw hematology no underlying hypercoagulable state Family history of DVT - Mother had PE (postop), Sister had PE (postop) Family history of venous insufficiency or varicose veins - mother Current compression stocking usage - intermittent use with improvement in swelling Has 1 child Currently unemployed, used to work in clinical  so sitting job Of note, patient sees a pulmonologist for breathing condition where she was told she does not ventilate well. She uses a machine at night to sleep.   + PMH: h/o postop DVT, HLD, ARVIND  + PSH: myomectomy x2 + FH: as above + SH: never smoker, social etoh use, no illicit substance use  + Aller: NKDA  Primary is Nereida Liu NP.  [de-identified] : 8/5/2024 - Pt presents for follow up. Has been using compression stockings with some improvement in her leg symptoms but symptoms recur when she is not using compression. Continues to have sensitivity over the varicose veins and the entire lower legs.

## 2024-08-12 ENCOUNTER — TRANSCRIPTION ENCOUNTER (OUTPATIENT)
Age: 49
End: 2024-08-12

## 2024-08-13 ENCOUNTER — LABORATORY RESULT (OUTPATIENT)
Age: 49
End: 2024-08-13

## 2024-08-16 ENCOUNTER — TRANSCRIPTION ENCOUNTER (OUTPATIENT)
Age: 49
End: 2024-08-16

## 2024-08-19 ENCOUNTER — TRANSCRIPTION ENCOUNTER (OUTPATIENT)
Age: 49
End: 2024-08-19

## 2024-09-05 ENCOUNTER — APPOINTMENT (OUTPATIENT)
Dept: GASTROENTEROLOGY | Facility: CLINIC | Age: 49
End: 2024-09-05
Payer: COMMERCIAL

## 2024-09-05 VITALS
SYSTOLIC BLOOD PRESSURE: 133 MMHG | TEMPERATURE: 98.3 F | BODY MASS INDEX: 41.95 KG/M2 | RESPIRATION RATE: 16 BRPM | WEIGHT: 293 LBS | DIASTOLIC BLOOD PRESSURE: 89 MMHG | HEIGHT: 70 IN | HEART RATE: 90 BPM

## 2024-09-05 DIAGNOSIS — Z12.11 ENCOUNTER FOR SCREENING FOR MALIGNANT NEOPLASM OF COLON: ICD-10-CM

## 2024-09-05 DIAGNOSIS — E66.01 MORBID (SEVERE) OBESITY DUE TO EXCESS CALORIES: ICD-10-CM

## 2024-09-05 DIAGNOSIS — Z83.719 FAMILY HISTORY OF COLON POLYPS, UNSPECIFIED: ICD-10-CM

## 2024-09-05 DIAGNOSIS — Z86.39 PERSONAL HISTORY OF OTHER ENDOCRINE, NUTRITIONAL AND METABOLIC DISEASE: ICD-10-CM

## 2024-09-05 DIAGNOSIS — G47.33 OBSTRUCTIVE SLEEP APNEA (ADULT) (PEDIATRIC): ICD-10-CM

## 2024-09-05 PROCEDURE — 99204 OFFICE O/P NEW MOD 45 MIN: CPT

## 2024-09-05 RX ORDER — LIRAGLUTIDE 6 MG/ML
18 INJECTION, SOLUTION SUBCUTANEOUS DAILY
Qty: 1 | Refills: 2 | Status: DISCONTINUED | COMMUNITY
Start: 2024-08-27 | End: 2024-09-05

## 2024-09-05 RX ORDER — POLYETHYLENE GLYCOL 3350, SODIUM CHLORIDE, SODIUM BICARBONATE AND POTASSIUM CHLORIDE WITH LEMON FLAVOR 420; 11.2; 5.72; 1.48 G/4L; G/4L; G/4L; G/4L
420 POWDER, FOR SOLUTION ORAL
Qty: 4000 | Refills: 0 | Status: ACTIVE | COMMUNITY
Start: 2024-09-05 | End: 1900-01-01

## 2024-09-05 RX ORDER — DULAGLUTIDE 0.75 MG/.5ML
0.75 INJECTION, SOLUTION SUBCUTANEOUS
Qty: 12 | Refills: 1 | Status: DISCONTINUED | COMMUNITY
Start: 2024-08-27 | End: 2024-09-05

## 2024-09-05 NOTE — HISTORY OF PRESENT ILLNESS
[FreeTextEntry1] : 49-year-old woman referred for a screening colonoscopy.  This will be her first colonoscopy.  She denies rectal bleeding, melena or hematemesis.  She has morbid obesity with a BMI of over 47 and ARVIND on CPAP.

## 2024-09-05 NOTE — ASSESSMENT
[FreeTextEntry1] : Screening colonoscopy.  Importance of colon cancer screening and the colonoscopy prep was discussed with the patient.  The procedure will be performed in the hospital outpatient endoscopy unit.  She understands and all questions were answered. Morbid obesity with ARVIND.  Pulmonary clearance requested.

## 2024-09-05 NOTE — CONSULT LETTER
[Dear  ___] : Dear  [unfilled], [Consult Letter:] : I had the pleasure of evaluating your patient, [unfilled]. [Please see my note below.] : Please see my note below. [Consult Closing:] : Thank you very much for allowing me to participate in the care of this patient.  If you have any questions, please do not hesitate to contact me. [Sincerely,] : Sincerely, [FreeTextEntry3] : David Vera MD, FACG

## 2024-09-05 NOTE — PHYSICAL EXAM
[Alert] : alert [Normal Voice/Communication] : normal voice/communication [Healthy Appearing] : healthy appearing [No Acute Distress] : no acute distress [Sclera] : the sclera and conjunctiva were normal [Hearing Threshold Finger Rub Not [place]] : hearing was normal [Normal Lips/Gums] : the lips and gums were normal [Oropharynx] : the oropharynx was normal [Normal Appearance] : the appearance of the neck was normal [No Neck Mass] : no neck mass was observed [No Respiratory Distress] : no respiratory distress [No Acc Muscle Use] : no accessory muscle use [Respiration, Rhythm And Depth] : normal respiratory rhythm and effort [Auscultation Breath Sounds / Voice Sounds] : lungs were clear to auscultation bilaterally [Heart Rate And Rhythm] : heart rate was normal and rhythm regular [Normal S1, S2] : normal S1 and S2 [Murmurs] : no murmurs [None] : no edema [Bowel Sounds] : normal bowel sounds [Abdomen Tenderness] : non-tender [No Masses] : no abdominal mass palpated [Abdomen Soft] : soft [] : no hepatosplenomegaly [Cervical Lymph Nodes Enlarged Posterior Bilaterally] : no posterior cervical lymphadenopathy [Supraclavicular Lymph Nodes Enlarged Bilaterally] : no supraclavicular lymphadenopathy [No CVA Tenderness] : no CVA  tenderness [Abnormal Walk] : normal gait [Motor Exam] : the motor exam was normal [Normal] : oriented to person, place, and time [Oriented To Time, Place, And Person] : oriented to person, place, and time [Normal Affect] : the affect was normal [Normal Mood] : the mood was normal

## 2024-09-25 ENCOUNTER — APPOINTMENT (OUTPATIENT)
Dept: PULMONOLOGY | Facility: CLINIC | Age: 49
End: 2024-09-25
Payer: COMMERCIAL

## 2024-09-25 VITALS — DIASTOLIC BLOOD PRESSURE: 86 MMHG | HEART RATE: 95 BPM | OXYGEN SATURATION: 91 % | SYSTOLIC BLOOD PRESSURE: 138 MMHG

## 2024-09-25 VITALS — OXYGEN SATURATION: 95 %

## 2024-09-25 DIAGNOSIS — G47.33 OBSTRUCTIVE SLEEP APNEA (ADULT) (PEDIATRIC): ICD-10-CM

## 2024-09-25 DIAGNOSIS — J96.11 CHRONIC RESPIRATORY FAILURE WITH HYPOXIA: ICD-10-CM

## 2024-09-25 DIAGNOSIS — R06.83 SNORING: ICD-10-CM

## 2024-09-25 DIAGNOSIS — E66.2 MORBID (SEVERE) OBESITY WITH ALVEOLAR HYPOVENTILATION: ICD-10-CM

## 2024-09-25 PROCEDURE — 94729 DIFFUSING CAPACITY: CPT

## 2024-09-25 PROCEDURE — ZZZZZ: CPT

## 2024-09-25 PROCEDURE — 94010 BREATHING CAPACITY TEST: CPT

## 2024-09-25 PROCEDURE — 71046 X-RAY EXAM CHEST 2 VIEWS: CPT

## 2024-09-25 PROCEDURE — 99204 OFFICE O/P NEW MOD 45 MIN: CPT | Mod: 25

## 2024-09-25 PROCEDURE — 94726 PLETHYSMOGRAPHY LUNG VOLUMES: CPT

## 2024-09-25 NOTE — DISCUSSION/SUMMARY
[FreeTextEntry1] : CT-chest did not show any parenchymal abnormalities and just bibasilar atelectasis--no PE. ECHO showed a nl LVEF and an RVSP of 27 mmHg. PFTs showed restrictive physiology and a reduced ERV consistent with class 3 obesity. Patient was counseled on weight loss and patient understood. No further workup from a pulmonary standpoint at this time.

## 2024-09-25 NOTE — HISTORY OF PRESENT ILLNESS
[Never] : never [TextBox_4] : EVERARDO HENRIQUEZ is a 49 year old female who presents for pulm re-eval PMH: [facility] from 3/15-3/20 for acute hypoxic/hypercapnic respiratory failure sent home on AVAps has been on it for a few years worried about being told of low saturations not using 02 athome  pendign routine colonsncopy does have some GERD  not any chronic inhalers  weight gain + since her last pulm eval

## 2024-09-25 NOTE — PHYSICAL EXAM
[No Acute Distress] : no acute distress [Low Lying Soft Palate] : low lying soft palate [IV] : Mallampati Class: IV [No Resp Distress] : no resp distress [No Acc Muscle Use] : no acc muscle use [Clear to Auscultation Bilaterally] : clear to auscultation bilaterally [Oriented x3] : oriented x3

## 2024-09-25 NOTE — PROCEDURE
[FreeTextEntry1] : PA and lateral chest xray performed using standard projections. Bones and soft tissues structures are unremarkable.Cardiac silhouette appears normal. Lung fields are clear. elevated left Presley. large gastric bubble.  Mediastinal contours are normal. IMPRESSION: elevated left presley  previous data reviewed: EXAM: CT ANGIO CHEST (W)AW IC   PROCEDURE DATE: 03/15/2021     INTERPRETATION: CLINICAL INFORMATION: Shortness of breath for one week. Evaluate for pulmonary embolism.  COMPARISON: None.  CONTRAST/COMPLICATIONS: IV Contrast: Omnipaque 350 / 60 cc administered / 30 cc discarded. Oral Contrast: None Complications: None  PROCEDURE: CT Angiography of the Chest. Sagittal and coronal reformats were performed as well as 3D (MIP) reconstructions.  FINDINGS:  LUNGS AND AIRWAYS: Patent central airways. Bilateral lower lung linear and basilar subsegmental atelectasis. PLEURA: No pleural effusion. MEDIASTINUM AND SHAYE: No lymphadenopathy. VESSELS: No main, right, left, or lobar pulmonary embolism. Evaluation of segmental and subsegmental pulmonary arteries is limited by contrast bolus and respiratory motion. Pulmonary artery is enlarged. HEART: Heart size is normal. No pericardial effusion. CHEST WALL AND LOWER NECK: Within normal limits. VISUALIZED UPPER ABDOMEN: Cholelithiasis. BONES: Degenerative changes.  IMPRESSION: No main, right, left, or lobar pulmonary embolism. Evaluation of segmental and subsegmental pulmonary arteries is limited.  Pulmonary artery is mildly enlarged which can be seen in the setting of pulmonary arterial hypertension.

## 2024-09-25 NOTE — REASON FOR VISIT
[Initial] : an initial visit [Sleep Evaluation] : sleep evaluation [TextBox_[id number]] : hypercarbic resp failure

## 2024-10-12 ENCOUNTER — APPOINTMENT (OUTPATIENT)
Dept: CT IMAGING | Facility: IMAGING CENTER | Age: 49
End: 2024-10-12

## 2024-10-12 ENCOUNTER — OUTPATIENT (OUTPATIENT)
Dept: OUTPATIENT SERVICES | Facility: HOSPITAL | Age: 49
LOS: 1 days | End: 2024-10-12
Payer: COMMERCIAL

## 2024-10-12 DIAGNOSIS — E66.2 MORBID (SEVERE) OBESITY WITH ALVEOLAR HYPOVENTILATION: ICD-10-CM

## 2024-10-12 DIAGNOSIS — Z98.890 OTHER SPECIFIED POSTPROCEDURAL STATES: Chronic | ICD-10-CM

## 2024-10-12 DIAGNOSIS — Z98.891 HISTORY OF UTERINE SCAR FROM PREVIOUS SURGERY: Chronic | ICD-10-CM

## 2024-10-12 PROCEDURE — 71250 CT THORAX DX C-: CPT | Mod: 26

## 2024-10-12 PROCEDURE — 71250 CT THORAX DX C-: CPT

## 2024-10-23 ENCOUNTER — NON-APPOINTMENT (OUTPATIENT)
Age: 49
End: 2024-10-23

## 2024-10-23 ENCOUNTER — APPOINTMENT (OUTPATIENT)
Dept: PULMONOLOGY | Facility: CLINIC | Age: 49
End: 2024-10-23

## 2024-10-23 VITALS — OXYGEN SATURATION: 93 % | HEART RATE: 104 BPM | SYSTOLIC BLOOD PRESSURE: 143 MMHG | DIASTOLIC BLOOD PRESSURE: 84 MMHG

## 2024-10-23 DIAGNOSIS — J96.11 CHRONIC RESPIRATORY FAILURE WITH HYPOXIA: ICD-10-CM

## 2024-10-23 DIAGNOSIS — E66.2 MORBID (SEVERE) OBESITY WITH ALVEOLAR HYPOVENTILATION: ICD-10-CM

## 2024-10-23 PROCEDURE — 90656 IIV3 VACC NO PRSV 0.5 ML IM: CPT

## 2024-10-23 PROCEDURE — G0008: CPT

## 2024-10-23 PROCEDURE — 99214 OFFICE O/P EST MOD 30 MIN: CPT | Mod: 25

## 2024-11-04 ENCOUNTER — APPOINTMENT (OUTPATIENT)
Dept: VASCULAR SURGERY | Facility: CLINIC | Age: 49
End: 2024-11-04

## 2024-12-02 ENCOUNTER — NON-APPOINTMENT (OUTPATIENT)
Age: 49
End: 2024-12-02

## 2024-12-17 NOTE — DIETITIAN NUTRITION RISK NOTIFICATION - UPON NUTRITIONAL ASSESSMENT BY THE REGISTERED DIETITIAN YOUR PATIENT WAS DETERMINED TO MEET CRITERIA/HAS EVIDENCE OF THE FOLLOWING DIAGNOSIS:
Patient admitted to room 11 at approximately 1930 via wheel chair from emergency room.  Reason for Admission: Bronchopneumonia   Report received from: Robin ERWIN RN  Patient was accompanied by Significant other and Daughter.  Discharge transportation provided by:  Patient ambulated/transferred:  independently. self.  Patient is alert and orientated x 4.  Outpatient Observation education provided to: (patient, family, friend)  MDRO Education done if applicable (MRSA, VRE, etc)  Safety risks were identified during admission:  none.   Yellow risk/fall band applied:  No  Home meds sent home: No  Home meds sent to pharmacy:Yes - Inhaller 1x  Detailed Belongings: Jewelries - 2 rings, Ankle braclet 1x. Cell phone, Jacket 1x, Purse, Pair Boots.    Shagufta Meredith RN    Not applicable

## 2025-01-22 ENCOUNTER — APPOINTMENT (OUTPATIENT)
Dept: PULMONOLOGY | Facility: CLINIC | Age: 50
End: 2025-01-22
Payer: MEDICAID

## 2025-01-22 VITALS
HEART RATE: 92 BPM | WEIGHT: 293 LBS | SYSTOLIC BLOOD PRESSURE: 154 MMHG | OXYGEN SATURATION: 92 % | BODY MASS INDEX: 41.95 KG/M2 | DIASTOLIC BLOOD PRESSURE: 88 MMHG | HEIGHT: 70 IN

## 2025-01-22 DIAGNOSIS — J98.6 DISORDERS OF DIAPHRAGM: ICD-10-CM

## 2025-01-22 PROCEDURE — 94727 GAS DIL/WSHOT DETER LNG VOL: CPT

## 2025-01-22 PROCEDURE — 99214 OFFICE O/P EST MOD 30 MIN: CPT | Mod: 25

## 2025-01-22 PROCEDURE — 94010 BREATHING CAPACITY TEST: CPT

## 2025-01-22 PROCEDURE — 94729 DIFFUSING CAPACITY: CPT

## 2025-01-23 PROBLEM — J98.6 ELEVATED HEMIDIAPHRAGM: Status: ACTIVE | Noted: 2025-01-22

## 2025-01-28 ENCOUNTER — APPOINTMENT (OUTPATIENT)
Dept: ENDOCRINOLOGY | Facility: CLINIC | Age: 50
End: 2025-01-28
Payer: MEDICAID

## 2025-01-28 VITALS
OXYGEN SATURATION: 92 % | RESPIRATION RATE: 18 BRPM | BODY MASS INDEX: 41.95 KG/M2 | TEMPERATURE: 97.8 F | SYSTOLIC BLOOD PRESSURE: 144 MMHG | HEART RATE: 100 BPM | WEIGHT: 293 LBS | DIASTOLIC BLOOD PRESSURE: 81 MMHG | HEIGHT: 70 IN

## 2025-01-28 DIAGNOSIS — R79.89 OTHER SPECIFIED ABNORMAL FINDINGS OF BLOOD CHEMISTRY: ICD-10-CM

## 2025-01-28 DIAGNOSIS — E55.9 VITAMIN D DEFICIENCY, UNSPECIFIED: ICD-10-CM

## 2025-01-28 DIAGNOSIS — E66.01 MORBID (SEVERE) OBESITY DUE TO EXCESS CALORIES: ICD-10-CM

## 2025-01-28 DIAGNOSIS — D49.7 NEOPLASM OF UNSPECIFIED BEHAVIOR OF ENDOCRINE GLANDS AND OTHER PARTS OF NERVOUS SYSTEM: ICD-10-CM

## 2025-01-28 DIAGNOSIS — E04.1 NONTOXIC SINGLE THYROID NODULE: ICD-10-CM

## 2025-01-28 PROCEDURE — 99204 OFFICE O/P NEW MOD 45 MIN: CPT

## 2025-01-29 LAB
24R-OH-CALCIDIOL SERPL-MCNC: 112 PG/ML
25(OH)D3 SERPL-MCNC: 34.8 NG/ML
ALBUMIN SERPL ELPH-MCNC: 4.2 G/DL
CALCIUM SERPL-MCNC: 9.9 MG/DL
CALCIUM SERPL-MCNC: 9.9 MG/DL
CREAT SERPL-MCNC: 0.74 MG/DL
EGFR: 100 ML/MIN/1.73M2
MAGNESIUM SERPL-MCNC: 2.2 MG/DL
PARATHYROID HORMONE INTACT: 78 PG/ML
PHOSPHATE SERPL-MCNC: 2.8 MG/DL
T4 FREE SERPL-MCNC: 1.3 NG/DL
TSH SERPL-ACNC: 1.62 UIU/ML

## 2025-01-30 LAB — CA-I SERPL-SCNC: 5.3 MG/DL

## 2025-02-24 ENCOUNTER — RX RENEWAL (OUTPATIENT)
Age: 50
End: 2025-02-24

## 2025-02-24 ENCOUNTER — APPOINTMENT (OUTPATIENT)
Dept: PULMONOLOGY | Facility: CLINIC | Age: 50
End: 2025-02-24

## 2025-03-11 ENCOUNTER — OUTPATIENT (OUTPATIENT)
Dept: OUTPATIENT SERVICES | Facility: HOSPITAL | Age: 50
LOS: 1 days | End: 2025-03-11

## 2025-03-11 ENCOUNTER — APPOINTMENT (OUTPATIENT)
Dept: RADIOLOGY | Facility: HOSPITAL | Age: 50
End: 2025-03-11
Payer: MEDICAID

## 2025-03-11 DIAGNOSIS — J96.11 CHRONIC RESPIRATORY FAILURE WITH HYPOXIA: ICD-10-CM

## 2025-03-11 DIAGNOSIS — Z98.891 HISTORY OF UTERINE SCAR FROM PREVIOUS SURGERY: Chronic | ICD-10-CM

## 2025-03-11 DIAGNOSIS — Z98.890 OTHER SPECIFIED POSTPROCEDURAL STATES: Chronic | ICD-10-CM

## 2025-03-11 PROCEDURE — 76000 FLUOROSCOPY <1 HR PHYS/QHP: CPT | Mod: 26

## 2025-03-13 ENCOUNTER — APPOINTMENT (OUTPATIENT)
Dept: FAMILY MEDICINE | Facility: CLINIC | Age: 50
End: 2025-03-13
Payer: MEDICAID

## 2025-03-13 VITALS
HEIGHT: 70 IN | HEART RATE: 98 BPM | OXYGEN SATURATION: 96 % | SYSTOLIC BLOOD PRESSURE: 138 MMHG | TEMPERATURE: 98 F | RESPIRATION RATE: 18 BRPM | DIASTOLIC BLOOD PRESSURE: 83 MMHG | BODY MASS INDEX: 41.95 KG/M2 | WEIGHT: 293 LBS

## 2025-03-13 DIAGNOSIS — I10 ESSENTIAL (PRIMARY) HYPERTENSION: ICD-10-CM

## 2025-03-13 DIAGNOSIS — E78.5 HYPERLIPIDEMIA, UNSPECIFIED: ICD-10-CM

## 2025-03-13 DIAGNOSIS — E55.9 VITAMIN D DEFICIENCY, UNSPECIFIED: ICD-10-CM

## 2025-03-13 DIAGNOSIS — D73.4 CYST OF SPLEEN: ICD-10-CM

## 2025-03-13 DIAGNOSIS — E66.01 MORBID (SEVERE) OBESITY DUE TO EXCESS CALORIES: ICD-10-CM

## 2025-03-13 DIAGNOSIS — K80.20 CALCULUS OF GALLBLADDER W/OUT CHOLECYSTITIS W/OUT OBSTRUCTION: ICD-10-CM

## 2025-03-13 PROCEDURE — 99214 OFFICE O/P EST MOD 30 MIN: CPT

## 2025-03-24 ENCOUNTER — APPOINTMENT (OUTPATIENT)
Dept: ULTRASOUND IMAGING | Facility: CLINIC | Age: 50
End: 2025-03-24
Payer: MEDICAID

## 2025-03-24 ENCOUNTER — APPOINTMENT (OUTPATIENT)
Dept: RADIOLOGY | Facility: CLINIC | Age: 50
End: 2025-03-24
Payer: MEDICAID

## 2025-03-24 ENCOUNTER — OUTPATIENT (OUTPATIENT)
Dept: OUTPATIENT SERVICES | Facility: HOSPITAL | Age: 50
LOS: 1 days | End: 2025-03-24
Payer: MEDICAID

## 2025-03-24 DIAGNOSIS — Z98.890 OTHER SPECIFIED POSTPROCEDURAL STATES: Chronic | ICD-10-CM

## 2025-03-24 DIAGNOSIS — D49.7 NEOPLASM OF UNSPECIFIED BEHAVIOR OF ENDOCRINE GLANDS AND OTHER PARTS OF NERVOUS SYSTEM: ICD-10-CM

## 2025-03-24 DIAGNOSIS — Z98.891 HISTORY OF UTERINE SCAR FROM PREVIOUS SURGERY: Chronic | ICD-10-CM

## 2025-03-24 PROCEDURE — 76536 US EXAM OF HEAD AND NECK: CPT

## 2025-03-24 PROCEDURE — 76536 US EXAM OF HEAD AND NECK: CPT | Mod: 26

## 2025-03-24 PROCEDURE — 77080 DXA BONE DENSITY AXIAL: CPT | Mod: 26

## 2025-03-24 PROCEDURE — 77080 DXA BONE DENSITY AXIAL: CPT

## 2025-03-24 PROCEDURE — 76700 US EXAM ABDOM COMPLETE: CPT | Mod: 26

## 2025-03-24 PROCEDURE — 76700 US EXAM ABDOM COMPLETE: CPT

## 2025-03-26 ENCOUNTER — RESULT CHARGE (OUTPATIENT)
Age: 50
End: 2025-03-26

## 2025-03-26 ENCOUNTER — APPOINTMENT (OUTPATIENT)
Dept: PULMONOLOGY | Facility: CLINIC | Age: 50
End: 2025-03-26

## 2025-03-26 ENCOUNTER — APPOINTMENT (OUTPATIENT)
Dept: PULMONOLOGY | Facility: CLINIC | Age: 50
End: 2025-03-26
Payer: MEDICAID

## 2025-03-26 VITALS — DIASTOLIC BLOOD PRESSURE: 87 MMHG | HEART RATE: 85 BPM | SYSTOLIC BLOOD PRESSURE: 142 MMHG | OXYGEN SATURATION: 91 %

## 2025-03-26 VITALS — OXYGEN SATURATION: 93 %

## 2025-03-26 DIAGNOSIS — J96.11 CHRONIC RESPIRATORY FAILURE WITH HYPOXIA: ICD-10-CM

## 2025-03-26 LAB
DEPRECATED D DIMER PPP IA-ACNC: 235 NG/ML DDU
POCT - HEMOGLOBIN (HGB), QUANTITATIVE, TRANSCUTANEOUS: 13.5

## 2025-03-26 PROCEDURE — 88738 HGB QUANT TRANSCUTANEOUS: CPT

## 2025-03-26 PROCEDURE — 94010 BREATHING CAPACITY TEST: CPT

## 2025-03-26 PROCEDURE — 99214 OFFICE O/P EST MOD 30 MIN: CPT | Mod: 25

## 2025-03-26 PROCEDURE — 94729 DIFFUSING CAPACITY: CPT

## 2025-03-26 PROCEDURE — ZZZZZ: CPT

## 2025-03-26 PROCEDURE — 36415 COLL VENOUS BLD VENIPUNCTURE: CPT

## 2025-03-26 PROCEDURE — 94727 GAS DIL/WSHOT DETER LNG VOL: CPT

## 2025-03-26 RX ORDER — FLUTICASONE FUROATE 100 UG/1
100 POWDER RESPIRATORY (INHALATION) DAILY
Qty: 1 | Refills: 0 | Status: ACTIVE | COMMUNITY
Start: 2025-03-26 | End: 1900-01-01

## 2025-03-27 LAB — CK SERPL-CCNC: 98 U/L

## 2025-03-28 LAB — ACRM BINDING ANTIBODY: <0.07 NMOL/L

## 2025-03-31 ENCOUNTER — APPOINTMENT (OUTPATIENT)
Dept: ENDOCRINOLOGY | Facility: CLINIC | Age: 50
End: 2025-03-31

## 2025-04-07 ENCOUNTER — NON-APPOINTMENT (OUTPATIENT)
Age: 50
End: 2025-04-07

## 2025-04-07 ENCOUNTER — APPOINTMENT (OUTPATIENT)
Dept: SURGERY | Facility: CLINIC | Age: 50
End: 2025-04-07
Payer: MEDICAID

## 2025-04-07 VITALS
HEIGHT: 70 IN | BODY MASS INDEX: 41.95 KG/M2 | DIASTOLIC BLOOD PRESSURE: 81 MMHG | SYSTOLIC BLOOD PRESSURE: 137 MMHG | HEART RATE: 102 BPM | TEMPERATURE: 97.6 F | OXYGEN SATURATION: 97 % | WEIGHT: 293 LBS

## 2025-04-07 PROCEDURE — 99204 OFFICE O/P NEW MOD 45 MIN: CPT

## 2025-04-08 ENCOUNTER — NON-APPOINTMENT (OUTPATIENT)
Age: 50
End: 2025-04-08

## 2025-04-09 ENCOUNTER — NON-APPOINTMENT (OUTPATIENT)
Age: 50
End: 2025-04-09

## 2025-04-10 ENCOUNTER — APPOINTMENT (OUTPATIENT)
Dept: ENDOCRINOLOGY | Facility: CLINIC | Age: 50
End: 2025-04-10
Payer: MEDICAID

## 2025-04-10 VITALS
WEIGHT: 293 LBS | SYSTOLIC BLOOD PRESSURE: 142 MMHG | RESPIRATION RATE: 18 BRPM | BODY MASS INDEX: 41.95 KG/M2 | HEART RATE: 80 BPM | TEMPERATURE: 97 F | DIASTOLIC BLOOD PRESSURE: 83 MMHG | HEIGHT: 70 IN | OXYGEN SATURATION: 96 %

## 2025-04-10 DIAGNOSIS — E55.9 VITAMIN D DEFICIENCY, UNSPECIFIED: ICD-10-CM

## 2025-04-10 DIAGNOSIS — E04.1 NONTOXIC SINGLE THYROID NODULE: ICD-10-CM

## 2025-04-10 DIAGNOSIS — R79.89 OTHER SPECIFIED ABNORMAL FINDINGS OF BLOOD CHEMISTRY: ICD-10-CM

## 2025-04-10 PROCEDURE — 99214 OFFICE O/P EST MOD 30 MIN: CPT

## 2025-04-30 ENCOUNTER — APPOINTMENT (OUTPATIENT)
Dept: PULMONOLOGY | Facility: CLINIC | Age: 50
End: 2025-04-30
Payer: MEDICAID

## 2025-04-30 ENCOUNTER — APPOINTMENT (OUTPATIENT)
Dept: NUCLEAR MEDICINE | Facility: IMAGING CENTER | Age: 50
End: 2025-04-30

## 2025-04-30 VITALS — OXYGEN SATURATION: 97 % | SYSTOLIC BLOOD PRESSURE: 136 MMHG | HEART RATE: 90 BPM | DIASTOLIC BLOOD PRESSURE: 84 MMHG

## 2025-04-30 DIAGNOSIS — J96.11 CHRONIC RESPIRATORY FAILURE WITH HYPOXIA: ICD-10-CM

## 2025-04-30 PROCEDURE — 94010 BREATHING CAPACITY TEST: CPT

## 2025-04-30 PROCEDURE — 99214 OFFICE O/P EST MOD 30 MIN: CPT | Mod: 25

## 2025-06-03 ENCOUNTER — RX RENEWAL (OUTPATIENT)
Age: 50
End: 2025-06-03

## 2025-08-08 ENCOUNTER — APPOINTMENT (OUTPATIENT)
Dept: ENDOCRINOLOGY | Facility: CLINIC | Age: 50
End: 2025-08-08

## 2025-08-27 ENCOUNTER — APPOINTMENT (OUTPATIENT)
Dept: NEUROLOGY | Facility: CLINIC | Age: 50
End: 2025-08-27
Payer: MEDICAID

## 2025-08-27 VITALS
DIASTOLIC BLOOD PRESSURE: 90 MMHG | BODY MASS INDEX: 41.95 KG/M2 | SYSTOLIC BLOOD PRESSURE: 146 MMHG | HEART RATE: 86 BPM | WEIGHT: 293 LBS | HEIGHT: 70 IN

## 2025-08-27 DIAGNOSIS — Q99.8 OTHER SPECIFIED CHROMOSOME ABNORMALITIES: ICD-10-CM

## 2025-08-27 PROCEDURE — 99204 OFFICE O/P NEW MOD 45 MIN: CPT

## 2025-09-04 ENCOUNTER — APPOINTMENT (OUTPATIENT)
Dept: FAMILY MEDICINE | Facility: CLINIC | Age: 50
End: 2025-09-04

## 2025-09-17 ENCOUNTER — APPOINTMENT (OUTPATIENT)
Dept: FAMILY MEDICINE | Facility: CLINIC | Age: 50
End: 2025-09-17
Payer: MEDICAID

## 2025-09-17 VITALS
RESPIRATION RATE: 18 BRPM | TEMPERATURE: 99.1 F | HEIGHT: 70 IN | OXYGEN SATURATION: 96 % | BODY MASS INDEX: 41.95 KG/M2 | HEART RATE: 88 BPM | DIASTOLIC BLOOD PRESSURE: 85 MMHG | WEIGHT: 293 LBS | SYSTOLIC BLOOD PRESSURE: 134 MMHG

## 2025-09-17 DIAGNOSIS — E55.9 VITAMIN D DEFICIENCY, UNSPECIFIED: ICD-10-CM

## 2025-09-17 DIAGNOSIS — E78.5 HYPERLIPIDEMIA, UNSPECIFIED: ICD-10-CM

## 2025-09-17 DIAGNOSIS — J98.6 DISORDERS OF DIAPHRAGM: ICD-10-CM

## 2025-09-17 DIAGNOSIS — E66.813 OBESITY, CLASS 3: ICD-10-CM

## 2025-09-17 DIAGNOSIS — I10 ESSENTIAL (PRIMARY) HYPERTENSION: ICD-10-CM

## 2025-09-17 DIAGNOSIS — Z00.00 ENCOUNTER FOR GENERAL ADULT MEDICAL EXAMINATION W/OUT ABNORMAL FINDINGS: ICD-10-CM

## 2025-09-17 DIAGNOSIS — Z71.3 DIETARY COUNSELING AND SURVEILLANCE: ICD-10-CM

## 2025-09-17 DIAGNOSIS — G47.33 OBSTRUCTIVE SLEEP APNEA (ADULT) (PEDIATRIC): ICD-10-CM

## 2025-09-17 DIAGNOSIS — F43.23 ADJUSTMENT DISORDER WITH MIXED ANXIETY AND DEPRESSED MOOD: ICD-10-CM

## 2025-09-17 PROCEDURE — 99396 PREV VISIT EST AGE 40-64: CPT

## 2025-09-17 RX ORDER — MULTIVIT-MIN/FOLIC/VIT K/LYCOP 400-300MCG
50 MCG TABLET ORAL
Qty: 90 | Refills: 1 | Status: ACTIVE | COMMUNITY
Start: 2025-09-17 | End: 1900-01-01